# Patient Record
Sex: FEMALE | Race: WHITE | NOT HISPANIC OR LATINO | Employment: OTHER | RURAL
[De-identification: names, ages, dates, MRNs, and addresses within clinical notes are randomized per-mention and may not be internally consistent; named-entity substitution may affect disease eponyms.]

---

## 2023-08-21 ENCOUNTER — OFFICE VISIT (OUTPATIENT)
Dept: ORTHOPEDICS | Facility: CLINIC | Age: 58
End: 2023-08-21
Payer: COMMERCIAL

## 2023-08-21 ENCOUNTER — CLINICAL SUPPORT (OUTPATIENT)
Dept: CARDIOLOGY | Facility: CLINIC | Age: 58
End: 2023-08-21
Payer: COMMERCIAL

## 2023-08-21 VITALS — HEIGHT: 68 IN | WEIGHT: 170 LBS | BODY MASS INDEX: 25.76 KG/M2

## 2023-08-21 DIAGNOSIS — S82.142A CLOSED FRACTURE OF LEFT TIBIAL PLATEAU, INITIAL ENCOUNTER: Primary | ICD-10-CM

## 2023-08-21 DIAGNOSIS — Z01.810 PRE-OPERATIVE CARDIOVASCULAR EXAMINATION: ICD-10-CM

## 2023-08-21 PROCEDURE — 99203 OFFICE O/P NEW LOW 30 MIN: CPT | Mod: PBBFAC | Performed by: ORTHOPAEDIC SURGERY

## 2023-08-21 PROCEDURE — 99204 PR OFFICE/OUTPT VISIT, NEW, LEVL IV, 45-59 MIN: ICD-10-PCS | Mod: S$PBB,,, | Performed by: ORTHOPAEDIC SURGERY

## 2023-08-21 PROCEDURE — 3008F PR BODY MASS INDEX (BMI) DOCUMENTED: ICD-10-PCS | Mod: CPTII,,, | Performed by: ORTHOPAEDIC SURGERY

## 2023-08-21 PROCEDURE — 99204 OFFICE O/P NEW MOD 45 MIN: CPT | Mod: S$PBB,,, | Performed by: ORTHOPAEDIC SURGERY

## 2023-08-21 PROCEDURE — 99212 OFFICE O/P EST SF 10 MIN: CPT | Mod: PBBFAC

## 2023-08-21 PROCEDURE — 93010 ELECTROCARDIOGRAM REPORT: CPT | Mod: S$PBB,,, | Performed by: INTERNAL MEDICINE

## 2023-08-21 PROCEDURE — 3008F BODY MASS INDEX DOCD: CPT | Mod: CPTII,,, | Performed by: ORTHOPAEDIC SURGERY

## 2023-08-21 PROCEDURE — 1159F MED LIST DOCD IN RCRD: CPT | Mod: CPTII,,, | Performed by: ORTHOPAEDIC SURGERY

## 2023-08-21 PROCEDURE — 93010 EKG 12-LEAD: ICD-10-PCS | Mod: S$PBB,,, | Performed by: INTERNAL MEDICINE

## 2023-08-21 PROCEDURE — 93005 ELECTROCARDIOGRAM TRACING: CPT | Mod: PBBFAC | Performed by: INTERNAL MEDICINE

## 2023-08-21 PROCEDURE — 1159F PR MEDICATION LIST DOCUMENTED IN MEDICAL RECORD: ICD-10-PCS | Mod: CPTII,,, | Performed by: ORTHOPAEDIC SURGERY

## 2023-08-21 RX ORDER — HYDROCODONE BITARTRATE AND ACETAMINOPHEN 7.5; 325 MG/1; MG/1
TABLET ORAL
COMMUNITY
Start: 2023-08-21

## 2023-08-21 RX ORDER — TRAMADOL HYDROCHLORIDE 50 MG/1
50 TABLET ORAL EVERY 6 HOURS
Qty: 30 TABLET | Refills: 0 | Status: SHIPPED | OUTPATIENT
Start: 2023-08-21

## 2023-08-21 NOTE — PATIENT INSTRUCTIONS
Your surgery is scheduled at [facility] in [place] for 2023    Pre-Op Testin2023    _______ Lab (Clinic Lab 1st Floor)  _______ Chest X-ray ([facility] 1st Floor)  ___x____ EKG (Clinic 2nd Floor)    Our office will contact you the day before surgery to give you  the arrival time.    *Do NOT eat or drink anything after midnight the night before your surgery.    *Bring all medications in their original bottles.    *Bring anything you may need for an overnight stay.     *Bathe with Hibiclens the night or morning before surgery.    *The morning of your surgery ONLY take blood pressure medications, heart medications, medications for acid reflux, and thyroid medications (the morning dose only).  *Take these medications with a sip of water.    *Do not take Insulin or Diabetic Medications the night before or the morning of your surgery, unless directed otherwise.    *Be sure that you have stopped blood thinners at the appropriate time, as instructed.  (_____ days prior to surgery)    *Bring your C-Pap machine if you have one.    *All jewelry and piercings MUST be removed prior to surgery.    *False eye lashes must be removed prior to surgery.    *Any questions regarding co-pays or deductibles with insurance, please contact the [facility] at #[phone].    *For Financial Assistance you may call #[phone] or #[phone].    Thank you for choosing Dr. Woo Liu for your Orthopedic needs.  We look forward to caring for you. If you have any questions, please contact our office at [phone].

## 2023-08-21 NOTE — PROGRESS NOTES
58-year-old female who Percocet step-off of            Clinic Note        CC:   Chief Complaint   Patient presents with    Left Leg - Injury    Left Knee - Injury        Principal problem: Closed fracture of left tibial plateau, initial encounter [S82.346A]     REASON FOR VISIT:       HISTORY:  58-year-old female who sustained a fall landing on her left leg she has a fracture of her lateral tibial plateau on left.      PAST MEDICAL HISTORY: History reviewed. No pertinent past medical history.       PAST SURGICAL HISTORY:   Past Surgical History:   Procedure Laterality Date     SECTION      HYSTERECTOMY            ALLERGIES: Review of patient's allergies indicates:  No Known Allergies     MEDICATIONS :  No current outpatient medications on file.     SOCIAL HISTORY:   Social History     Socioeconomic History    Marital status:    Tobacco Use    Smoking status: Never    Smokeless tobacco: Never   Substance and Sexual Activity    Alcohol use: Never    Drug use: Never          FAMILY HISTORY:   Family History   Problem Relation Age of Onset    Cancer Mother     Nelda Parkinson White syndrome Mother           PHYSICAL EXAM:  In general, this is a well-developed, well-nourished female . The patient is alert, oriented and cooperative.      HEENT:  Normocephalic, atraumatic.  Extraocular movements are intact bilaterally.  The oropharynx is benign.       NECK:  Nontender with good range of motion.      LUNGS:  Clear to auscultation bilaterally.      HEART:  Demonstrates a regular rate and rhythm.  No murmurs appreciated.      ABDOMEN:  Soft, non-tender, non-distended.        EXTREMITIES:  Left lower extremity she moves her toes has motor function distally 5/5 sensation to touch she has swelling with some tenderness and ecchymosis over proximal tibia.  Pain on attempted motion of the knee.      RADIOGRAPHIC FINDINGS:  X-rays show fracture of the tibial plateau with depression of the lateral joint  line      IMPRESSION: Closed fracture of left tibial plateau, initial encounter         PLAN:  This time we discussed treatment options we are going to plan on open reduction internal fixation left tibial plateau fracture with a possible bone grafting.  We discussed risks and benefits wished to proceed with procedure.  We will set her up for surgery in the near future.  There are no Patient Instructions on file for this visit.      No follow-ups on file.         Woo Liu      (Subject to voice recognition error, transcription service not allowed)

## 2023-08-21 NOTE — PROGRESS NOTES
Clinic Note        CC:   Chief Complaint   Patient presents with    Left Leg - Injury    Left Knee - Injury        Principal problem: Closed fracture of left tibial plateau, initial encounter [S83.709Q]     REASON FOR VISIT:       HISTORY:  58-year-old female who sustained a fall when she landed on her feet she sustained a fracture of her lateral tibial plateau CT and x-rays show she has a central depression      PAST MEDICAL HISTORY: No past medical history on file.       PAST SURGICAL HISTORY: No past surgical history on file.       ALLERGIES: Review of patient's allergies indicates:  Not on File     MEDICATIONS :  No current outpatient medications on file.     SOCIAL HISTORY:   Social History     Socioeconomic History    Marital status:           FAMILY HISTORY: No family history on file.       PHYSICAL EXAM:  In general, this is a well-developed, well-nourished female . The patient is alert, oriented and cooperative.      HEENT:  Normocephalic, atraumatic.  Extraocular movements are intact bilaterally.  The oropharynx is benign.       NECK:  Nontender with good range of motion.      LUNGS:  Clear to auscultation bilaterally.      HEART:  Demonstrates a regular rate and rhythm.  No murmurs appreciated.      ABDOMEN:  Soft, non-tender, non-distended.        EXTREMITIES:  Left lower extremity she moves her toes has sensation to touch has palpable pulses tender to palpation over her knee she has some swelling ecchymosis noted pain on attempted motion of the knee she is in an immobilizer.      RADIOGRAPHIC FINDINGS:  X-rays show fracture with depression lateral tibial plateau on left      IMPRESSION: Closed fracture of left tibial plateau, initial encounter         PLAN:  We discussed treatment options including risks and benefits of surgery.  We are going to plan on doing a surgical open reduction internal fixation possible bone grafting of the left tibial plateau will set this up as her soft  tissues allow later in a week.  There are no Patient Instructions on file for this visit.      No follow-ups on file.         Woo Liu      (Subject to voice recognition error, transcription service not allowed)

## 2023-08-24 ENCOUNTER — ANESTHESIA (OUTPATIENT)
Dept: SURGERY | Facility: HOSPITAL | Age: 58
End: 2023-08-24
Payer: COMMERCIAL

## 2023-08-24 ENCOUNTER — HOSPITAL ENCOUNTER (OUTPATIENT)
Facility: HOSPITAL | Age: 58
Discharge: HOME HEALTH | End: 2023-08-25
Attending: ORTHOPAEDIC SURGERY | Admitting: ORTHOPAEDIC SURGERY
Payer: COMMERCIAL

## 2023-08-24 ENCOUNTER — ANESTHESIA EVENT (OUTPATIENT)
Dept: SURGERY | Facility: HOSPITAL | Age: 58
End: 2023-08-24
Payer: COMMERCIAL

## 2023-08-24 VITALS
HEART RATE: 79 BPM | DIASTOLIC BLOOD PRESSURE: 72 MMHG | RESPIRATION RATE: 11 BRPM | OXYGEN SATURATION: 97 % | SYSTOLIC BLOOD PRESSURE: 145 MMHG

## 2023-08-24 DIAGNOSIS — S82.142A TIBIAL PLATEAU FRACTURE, LEFT: ICD-10-CM

## 2023-08-24 PROCEDURE — 25000003 PHARM REV CODE 250: Performed by: ORTHOPAEDIC SURGERY

## 2023-08-24 PROCEDURE — 27535 PR OPEN TX TIBIAL FRACTURE PROXIMAL UNICONDYLAR: ICD-10-PCS | Mod: LT,,, | Performed by: ORTHOPAEDIC SURGERY

## 2023-08-24 PROCEDURE — 27000221 HC OXYGEN, UP TO 24 HOURS

## 2023-08-24 PROCEDURE — C1889 IMPLANT/INSERT DEVICE, NOC: HCPCS | Performed by: ORTHOPAEDIC SURGERY

## 2023-08-24 PROCEDURE — 63600175 PHARM REV CODE 636 W HCPCS: Performed by: ANESTHESIOLOGY

## 2023-08-24 PROCEDURE — 36000710: Performed by: ORTHOPAEDIC SURGERY

## 2023-08-24 PROCEDURE — 36000711: Performed by: ORTHOPAEDIC SURGERY

## 2023-08-24 PROCEDURE — 27000655: Performed by: ANESTHESIOLOGY

## 2023-08-24 PROCEDURE — D9220A PRA ANESTHESIA: Mod: CRNA,,, | Performed by: NURSE ANESTHETIST, CERTIFIED REGISTERED

## 2023-08-24 PROCEDURE — D9220A PRA ANESTHESIA: ICD-10-PCS | Mod: CRNA,,, | Performed by: NURSE ANESTHETIST, CERTIFIED REGISTERED

## 2023-08-24 PROCEDURE — 27535 TREAT KNEE FRACTURE: CPT | Mod: LT,,, | Performed by: ORTHOPAEDIC SURGERY

## 2023-08-24 PROCEDURE — 27000177 HC AIRWAY, LARYNGEAL MASK: Performed by: NURSE ANESTHETIST, CERTIFIED REGISTERED

## 2023-08-24 PROCEDURE — 27000510 HC BLANKET BAIR HUGGER ANY SIZE: Performed by: NURSE ANESTHETIST, CERTIFIED REGISTERED

## 2023-08-24 PROCEDURE — 25000003 PHARM REV CODE 250: Performed by: ANESTHESIOLOGY

## 2023-08-24 PROCEDURE — 27201423 OPTIME MED/SURG SUP & DEVICES STERILE SUPPLY: Performed by: ORTHOPAEDIC SURGERY

## 2023-08-24 PROCEDURE — 25000003 PHARM REV CODE 250: Performed by: NURSE ANESTHETIST, CERTIFIED REGISTERED

## 2023-08-24 PROCEDURE — 37000008 HC ANESTHESIA 1ST 15 MINUTES: Performed by: ORTHOPAEDIC SURGERY

## 2023-08-24 PROCEDURE — 63600175 PHARM REV CODE 636 W HCPCS: Performed by: ORTHOPAEDIC SURGERY

## 2023-08-24 PROCEDURE — 37000009 HC ANESTHESIA EA ADD 15 MINS: Performed by: ORTHOPAEDIC SURGERY

## 2023-08-24 PROCEDURE — 63600175 PHARM REV CODE 636 W HCPCS: Performed by: NURSE ANESTHETIST, CERTIFIED REGISTERED

## 2023-08-24 PROCEDURE — 27000177 HC AIRWAY, LARYNGEAL MASK: Performed by: ANESTHESIOLOGY

## 2023-08-24 PROCEDURE — D9220A PRA ANESTHESIA: Mod: ANES,,, | Performed by: ANESTHESIOLOGY

## 2023-08-24 PROCEDURE — D9220A PRA ANESTHESIA: ICD-10-PCS | Mod: ANES,,, | Performed by: ANESTHESIOLOGY

## 2023-08-24 PROCEDURE — 99900035 HC TECH TIME PER 15 MIN (STAT)

## 2023-08-24 PROCEDURE — C1713 ANCHOR/SCREW BN/BN,TIS/BN: HCPCS | Performed by: ORTHOPAEDIC SURGERY

## 2023-08-24 PROCEDURE — 27000716 HC OXISENSOR PROBE, ANY SIZE: Performed by: ANESTHESIOLOGY

## 2023-08-24 PROCEDURE — 27000716 HC OXISENSOR PROBE, ANY SIZE: Performed by: NURSE ANESTHETIST, CERTIFIED REGISTERED

## 2023-08-24 PROCEDURE — 71000033 HC RECOVERY, INTIAL HOUR: Performed by: ORTHOPAEDIC SURGERY

## 2023-08-24 DEVICE — SCREW LOCKING 3.5X 80MM: Type: IMPLANTABLE DEVICE | Site: LEG | Status: FUNCTIONAL

## 2023-08-24 DEVICE — SCREW CORTEX 3.5MM X 34MM: Type: IMPLANTABLE DEVICE | Site: LEG | Status: FUNCTIONAL

## 2023-08-24 DEVICE — CHRONOS(TM) GRANULES-LARGE 2.8-5.6MM/10CC-STERILE
Type: IMPLANTABLE DEVICE | Site: LEG | Status: FUNCTIONAL
Brand: CHRONOS

## 2023-08-24 DEVICE — SCREW LOCKING 3.5X 75MM: Type: IMPLANTABLE DEVICE | Site: LEG | Status: FUNCTIONAL

## 2023-08-24 DEVICE — SCREW CORTEX 3.5 38M: Type: IMPLANTABLE DEVICE | Site: LEG | Status: FUNCTIONAL

## 2023-08-24 DEVICE — SCREW LOCKING 3.5X 55MM: Type: IMPLANTABLE DEVICE | Site: LEG | Status: FUNCTIONAL

## 2023-08-24 RX ORDER — KETOROLAC TROMETHAMINE 30 MG/ML
INJECTION, SOLUTION INTRAMUSCULAR; INTRAVENOUS
Status: DISCONTINUED | OUTPATIENT
Start: 2023-08-24 | End: 2023-08-24

## 2023-08-24 RX ORDER — SODIUM CHLORIDE 9 MG/ML
INJECTION, SOLUTION INTRAVENOUS CONTINUOUS
Status: DISCONTINUED | OUTPATIENT
Start: 2023-08-24 | End: 2023-08-25 | Stop reason: HOSPADM

## 2023-08-24 RX ORDER — MORPHINE SULFATE 4 MG/ML
4 INJECTION, SOLUTION INTRAMUSCULAR; INTRAVENOUS EVERY 4 HOURS PRN
Status: DISCONTINUED | OUTPATIENT
Start: 2023-08-24 | End: 2023-08-25 | Stop reason: HOSPADM

## 2023-08-24 RX ORDER — ONDANSETRON 2 MG/ML
4 INJECTION INTRAMUSCULAR; INTRAVENOUS DAILY PRN
Status: DISCONTINUED | OUTPATIENT
Start: 2023-08-24 | End: 2023-08-24 | Stop reason: HOSPADM

## 2023-08-24 RX ORDER — HYDROCODONE BITARTRATE AND ACETAMINOPHEN 5; 325 MG/1; MG/1
1 TABLET ORAL EVERY 4 HOURS PRN
Status: DISCONTINUED | OUTPATIENT
Start: 2023-08-24 | End: 2023-08-25 | Stop reason: HOSPADM

## 2023-08-24 RX ORDER — BISACODYL 10 MG
10 SUPPOSITORY, RECTAL RECTAL DAILY PRN
Status: DISCONTINUED | OUTPATIENT
Start: 2023-08-24 | End: 2023-08-25 | Stop reason: HOSPADM

## 2023-08-24 RX ORDER — DEXAMETHASONE SODIUM PHOSPHATE 4 MG/ML
INJECTION, SOLUTION INTRA-ARTICULAR; INTRALESIONAL; INTRAMUSCULAR; INTRAVENOUS; SOFT TISSUE
Status: DISCONTINUED | OUTPATIENT
Start: 2023-08-24 | End: 2023-08-24

## 2023-08-24 RX ORDER — DOCUSATE SODIUM 100 MG/1
100 CAPSULE, LIQUID FILLED ORAL EVERY 12 HOURS
Status: DISCONTINUED | OUTPATIENT
Start: 2023-08-24 | End: 2023-08-25 | Stop reason: HOSPADM

## 2023-08-24 RX ORDER — MEPERIDINE HYDROCHLORIDE 25 MG/ML
25 INJECTION INTRAMUSCULAR; INTRAVENOUS; SUBCUTANEOUS ONCE AS NEEDED
Status: DISCONTINUED | OUTPATIENT
Start: 2023-08-24 | End: 2023-08-24 | Stop reason: HOSPADM

## 2023-08-24 RX ORDER — HYDROMORPHONE HYDROCHLORIDE 2 MG/ML
0.5 INJECTION, SOLUTION INTRAMUSCULAR; INTRAVENOUS; SUBCUTANEOUS EVERY 5 MIN PRN
Status: DISCONTINUED | OUTPATIENT
Start: 2023-08-24 | End: 2023-08-24 | Stop reason: HOSPADM

## 2023-08-24 RX ORDER — SODIUM CHLORIDE 9 MG/ML
INJECTION, SOLUTION INTRAVENOUS CONTINUOUS
Status: DISCONTINUED | OUTPATIENT
Start: 2023-08-24 | End: 2023-08-24

## 2023-08-24 RX ORDER — ONDANSETRON 2 MG/ML
INJECTION INTRAMUSCULAR; INTRAVENOUS
Status: DISCONTINUED | OUTPATIENT
Start: 2023-08-24 | End: 2023-08-24

## 2023-08-24 RX ORDER — TRAMADOL HYDROCHLORIDE 50 MG/1
50 TABLET ORAL ONCE
Status: COMPLETED | OUTPATIENT
Start: 2023-08-24 | End: 2023-08-24

## 2023-08-24 RX ORDER — DIPHENHYDRAMINE HYDROCHLORIDE 50 MG/ML
25 INJECTION INTRAMUSCULAR; INTRAVENOUS EVERY 6 HOURS PRN
Status: DISCONTINUED | OUTPATIENT
Start: 2023-08-24 | End: 2023-08-24 | Stop reason: HOSPADM

## 2023-08-24 RX ORDER — FENTANYL CITRATE 50 UG/ML
INJECTION, SOLUTION INTRAMUSCULAR; INTRAVENOUS
Status: DISCONTINUED | OUTPATIENT
Start: 2023-08-24 | End: 2023-08-24

## 2023-08-24 RX ORDER — ONDANSETRON 2 MG/ML
4 INJECTION INTRAMUSCULAR; INTRAVENOUS EVERY 8 HOURS PRN
Status: DISCONTINUED | OUTPATIENT
Start: 2023-08-24 | End: 2023-08-25 | Stop reason: HOSPADM

## 2023-08-24 RX ORDER — MORPHINE SULFATE 10 MG/ML
4 INJECTION INTRAMUSCULAR; INTRAVENOUS; SUBCUTANEOUS EVERY 5 MIN PRN
Status: DISCONTINUED | OUTPATIENT
Start: 2023-08-24 | End: 2023-08-24 | Stop reason: HOSPADM

## 2023-08-24 RX ORDER — DIMENHYDRINATE 50 MG
50 TABLET ORAL ONCE
Status: COMPLETED | OUTPATIENT
Start: 2023-08-24 | End: 2023-08-24

## 2023-08-24 RX ORDER — LIDOCAINE HYDROCHLORIDE 20 MG/ML
INJECTION, SOLUTION EPIDURAL; INFILTRATION; INTRACAUDAL; PERINEURAL
Status: DISCONTINUED | OUTPATIENT
Start: 2023-08-24 | End: 2023-08-24

## 2023-08-24 RX ORDER — PROPOFOL 10 MG/ML
VIAL (ML) INTRAVENOUS
Status: DISCONTINUED | OUTPATIENT
Start: 2023-08-24 | End: 2023-08-24

## 2023-08-24 RX ORDER — CEFAZOLIN SODIUM 1 G/3ML
INJECTION, POWDER, FOR SOLUTION INTRAMUSCULAR; INTRAVENOUS
Status: DISCONTINUED | OUTPATIENT
Start: 2023-08-24 | End: 2023-08-24

## 2023-08-24 RX ORDER — MIDAZOLAM HYDROCHLORIDE 1 MG/ML
INJECTION INTRAMUSCULAR; INTRAVENOUS
Status: DISCONTINUED | OUTPATIENT
Start: 2023-08-24 | End: 2023-08-24

## 2023-08-24 RX ADMIN — HYDROCODONE BITARTRATE AND ACETAMINOPHEN 1 TABLET: 5; 325 TABLET ORAL at 10:08

## 2023-08-24 RX ADMIN — LIDOCAINE HYDROCHLORIDE 100 MG: 20 INJECTION, SOLUTION INTRAVENOUS at 05:08

## 2023-08-24 RX ADMIN — SODIUM CHLORIDE: 9 INJECTION, SOLUTION INTRAVENOUS at 12:08

## 2023-08-24 RX ADMIN — SODIUM CHLORIDE: 9 INJECTION, SOLUTION INTRAVENOUS at 10:08

## 2023-08-24 RX ADMIN — KETOROLAC TROMETHAMINE 30 MG: 30 INJECTION, SOLUTION INTRAMUSCULAR at 05:08

## 2023-08-24 RX ADMIN — HYDROMORPHONE HYDROCHLORIDE 0.5 MG: 2 INJECTION INTRAMUSCULAR; INTRAVENOUS; SUBCUTANEOUS at 07:08

## 2023-08-24 RX ADMIN — MIDAZOLAM 2 MG: 1 INJECTION INTRAMUSCULAR; INTRAVENOUS at 05:08

## 2023-08-24 RX ADMIN — CEFAZOLIN 2 G: 2 INJECTION, POWDER, FOR SOLUTION INTRAMUSCULAR; INTRAVENOUS at 10:08

## 2023-08-24 RX ADMIN — DEXAMETHASONE SODIUM PHOSPHATE 10 MG: 4 INJECTION, SOLUTION INTRA-ARTICULAR; INTRALESIONAL; INTRAMUSCULAR; INTRAVENOUS; SOFT TISSUE at 05:08

## 2023-08-24 RX ADMIN — PROPOFOL 150 MG: 10 INJECTION, EMULSION INTRAVENOUS at 05:08

## 2023-08-24 RX ADMIN — DIMENHYDRINATE 50 MG: 50 TABLET ORAL at 01:08

## 2023-08-24 RX ADMIN — TRAMADOL HYDROCHLORIDE 50 MG: 50 TABLET, COATED ORAL at 02:08

## 2023-08-24 RX ADMIN — CEFAZOLIN 2 G: 1 INJECTION, POWDER, FOR SOLUTION INTRAMUSCULAR; INTRAVENOUS; PARENTERAL at 05:08

## 2023-08-24 RX ADMIN — FENTANYL CITRATE 50 MCG: 50 INJECTION INTRAMUSCULAR; INTRAVENOUS at 05:08

## 2023-08-24 RX ADMIN — ONDANSETRON 4 MG: 2 INJECTION INTRAMUSCULAR; INTRAVENOUS at 05:08

## 2023-08-24 RX ADMIN — DOCUSATE SODIUM 100 MG: 100 CAPSULE, LIQUID FILLED ORAL at 10:08

## 2023-08-24 NOTE — ANESTHESIA PREPROCEDURE EVALUATION
08/24/2023  Penny Murphy is a 58 y.o., female.      Pre-op Assessment    I have reviewed the Patient Summary Reports.     I have reviewed the Nursing Notes. I have reviewed the NPO Status.   I have reviewed the Medications.     Review of Systems  Anesthesia Hx:  Denies Family Hx of Anesthesia complications.  Personal Hx of Anesthesia complications, Post-Operative Nausea/Vomiting, in the past, but not with recent anesthetics / prophylaxis Slow To Awaken/Delayed Emergence and mild, somewhat sensitive to sedation / narcotics   Cardiovascular:   Exercise tolerance: good        Physical Exam  General: Well nourished, Cooperative and Alert    Airway:  Mallampati: II   Mouth Opening: Normal  TM Distance: Normal  Tongue: Normal  Neck ROM: Normal ROM    Dental:  Intact    Chest/Lungs:  Clear to auscultation, Normal Respiratory Rate    Heart:  Rate: Normal  Rhythm: Regular Rhythm          Anesthesia Plan  Type of Anesthesia, risks & benefits discussed:    Anesthesia Type: Gen Supraglottic Airway  Intra-op Monitoring Plan: Standard ASA Monitors  Post Op Pain Control Plan: multimodal analgesia  Induction:  IV  Airway Plan: Direct, Post-Induction  Informed Consent: Informed consent signed with the Patient and all parties understand the risks and agree with anesthesia plan.  All questions answered.   ASA Score: 1  Day of Surgery Review of History & Physical: H&P Update referred to the surgeon/provider.I have interviewed and examined the patient. I have reviewed the patient's H&P dated: There are no significant changes.   Anesthesia Plan Notes: ASA 1 planned GA-LMA    Patient has h/o delayed emergence and PONV that occurred with hysterectomy. Planned used of short acting medications when possible.    Ready For Surgery From Anesthesia Perspective.     .

## 2023-08-24 NOTE — PROGRESS NOTES
Department of Orthopedic Surgery    History and Physical       Principal Problem: Closed fracture of left tibial plateau, initial encounter [E02.781U]           HISTORY:  58-year-old female with a left lateral tibial plateau fracture that is displaced needing open reduction internal fixation with possible bone grafting of the left tibial plateau    PAST MEDICAL HISTORY: History reviewed. No pertinent past medical history.     PAST SURGICAL HISTORY:   Past Surgical History:   Procedure Laterality Date     SECTION      HYSTERECTOMY  2006          ALLERGIES: Review of patient's allergies indicates:  No Known Allergies       MEDICATIONS: (Not in a hospital admission)       SOCIAL HISTORY:   Social History     Socioeconomic History    Marital status:    Tobacco Use    Smoking status: Never    Smokeless tobacco: Never   Substance and Sexual Activity    Alcohol use: Never    Drug use: Never          FAMILY HISTORY:   Family History   Problem Relation Age of Onset    Cancer Mother     Nelda Parkinson White syndrome Mother           PHYSICAL EXAM:   There were no vitals filed for this visit.  Body mass index is 25.85 kg/m².     In general, this is a well-developed, well-nourished female . The patient is alert, oriented and cooperative.      HEENT:  Normocephalic, atraumatic.  Extraocular movements are intact bilaterally.  The oropharynx is benign.       NECK:  Nontender with good range of motion.      LUNGS:  Clear to auscultation bilaterally.      HEART:  Demonstrates a regular rate and rhythm.  No murmurs appreciated.      ABDOMEN:  Soft, non-tender, non-distended.        EXTREMITIES:  Left lower extremity she moves her toes she has sensation to touch has palpable pulses she has swelling ecchymosis about her knee tender to palpation over lateral tibial plateau pain on attempted motion of the knee      RADIOGRAPHIC FINDINGS:  X-rays show a depressed tibial plateau fracture on left CT confirms these  findings      IMPRESSION:  Left lateral tibial plateau fracture displaced      PLAN:  Open reduction internal fixation left lateral tibial plateau fracture with possible allograft bone grafting    I had a long discussion with the patient about treatment options, including operative and nonoperative treatments. We discussed pros and cons of each including risks pertinent to surgery including pain, infection, bleeding, damage to adjacent structures like nerves and blood vessels, failure to heal, need for future surgeries, stiffness, instability, loss of limb, anesthesia risks like stroke, blood clot, loss of life. We discussed the possibility of need for later hardware removal in the case that hardware was used. We discussed common and uncommon risks, and discussed patient specific factors that may increase the risks present with surgery. All questions were answered. The patient expressed understanding of the pros and cons of surgery and wanted to proceed with surgical treatment.        (Subject to voice recognition error, transcription service not allowed)

## 2023-08-24 NOTE — INTERVAL H&P NOTE
The patient has been examined and the H&P has been reviewed:    I concur with the findings and no changes have occurred since H&P was written.    Surgery risks, benefits and alternative options discussed and understood by patient/family.          Active Hospital Problems    Diagnosis  POA    *Tibial plateau fracture, left [S80.382A]  Yes      Resolved Hospital Problems   No resolved problems to display.

## 2023-08-24 NOTE — H&P (VIEW-ONLY)
Department of Orthopedic Surgery    History and Physical       Principal Problem: Closed fracture of left tibial plateau, initial encounter [U84.396J]           HISTORY:  58-year-old female with a left lateral tibial plateau fracture that is displaced needing open reduction internal fixation with possible bone grafting of the left tibial plateau    PAST MEDICAL HISTORY: History reviewed. No pertinent past medical history.     PAST SURGICAL HISTORY:   Past Surgical History:   Procedure Laterality Date     SECTION      HYSTERECTOMY  2006          ALLERGIES: Review of patient's allergies indicates:  No Known Allergies       MEDICATIONS: (Not in a hospital admission)       SOCIAL HISTORY:   Social History     Socioeconomic History    Marital status:    Tobacco Use    Smoking status: Never    Smokeless tobacco: Never   Substance and Sexual Activity    Alcohol use: Never    Drug use: Never          FAMILY HISTORY:   Family History   Problem Relation Age of Onset    Cancer Mother     Nelda Parkinson White syndrome Mother           PHYSICAL EXAM:   There were no vitals filed for this visit.  Body mass index is 25.85 kg/m².     In general, this is a well-developed, well-nourished female . The patient is alert, oriented and cooperative.      HEENT:  Normocephalic, atraumatic.  Extraocular movements are intact bilaterally.  The oropharynx is benign.       NECK:  Nontender with good range of motion.      LUNGS:  Clear to auscultation bilaterally.      HEART:  Demonstrates a regular rate and rhythm.  No murmurs appreciated.      ABDOMEN:  Soft, non-tender, non-distended.        EXTREMITIES:  Left lower extremity she moves her toes she has sensation to touch has palpable pulses she has swelling ecchymosis about her knee tender to palpation over lateral tibial plateau pain on attempted motion of the knee      RADIOGRAPHIC FINDINGS:  X-rays show a depressed tibial plateau fracture on left CT confirms these  findings      IMPRESSION:  Left lateral tibial plateau fracture displaced      PLAN:  Open reduction internal fixation left lateral tibial plateau fracture with possible allograft bone grafting    I had a long discussion with the patient about treatment options, including operative and nonoperative treatments. We discussed pros and cons of each including risks pertinent to surgery including pain, infection, bleeding, damage to adjacent structures like nerves and blood vessels, failure to heal, need for future surgeries, stiffness, instability, loss of limb, anesthesia risks like stroke, blood clot, loss of life. We discussed the possibility of need for later hardware removal in the case that hardware was used. We discussed common and uncommon risks, and discussed patient specific factors that may increase the risks present with surgery. All questions were answered. The patient expressed understanding of the pros and cons of surgery and wanted to proceed with surgical treatment.        (Subject to voice recognition error, transcription service not allowed)

## 2023-08-24 NOTE — ANESTHESIA PROCEDURE NOTES
Intubation    Date/Time: 8/24/2023 5:07 PM    Performed by: Abilio Garnica CRNA  Authorized by: Steve Castrejon MD    Intubation:     Induction:  Intravenous    Intubated:  Postinduction    Mask Ventilation:  Easy mask    Attempts:  1    Attempted By:  CRNA    Difficult Airway Encountered?: No      Complications:  None    Airway Device:  Supraglottic airway/LMA    Airway Device Size:  4.0    Style/Cuff Inflation:  Cuffed (inflated to minimal occlusive pressure)    Placement Verified By:  Capnometry    Complicating Factors:  None    Findings Post-Intubation:  BS equal bilateral

## 2023-08-25 VITALS
TEMPERATURE: 98 F | SYSTOLIC BLOOD PRESSURE: 155 MMHG | HEIGHT: 68 IN | WEIGHT: 173.06 LBS | BODY MASS INDEX: 26.23 KG/M2 | OXYGEN SATURATION: 98 % | HEART RATE: 87 BPM | RESPIRATION RATE: 16 BRPM | DIASTOLIC BLOOD PRESSURE: 82 MMHG

## 2023-08-25 PROCEDURE — 97161 PT EVAL LOW COMPLEX 20 MIN: CPT

## 2023-08-25 PROCEDURE — 97165 OT EVAL LOW COMPLEX 30 MIN: CPT

## 2023-08-25 PROCEDURE — 63600175 PHARM REV CODE 636 W HCPCS: Performed by: ORTHOPAEDIC SURGERY

## 2023-08-25 PROCEDURE — 25000003 PHARM REV CODE 250: Performed by: ORTHOPAEDIC SURGERY

## 2023-08-25 RX ORDER — HYDROCODONE BITARTRATE AND ACETAMINOPHEN 10; 325 MG/1; MG/1
1 TABLET ORAL EVERY 4 HOURS PRN
Qty: 30 TABLET | Refills: 0 | Status: SHIPPED | OUTPATIENT
Start: 2023-08-25 | End: 2023-09-08

## 2023-08-25 RX ADMIN — MORPHINE SULFATE 4 MG: 4 INJECTION, SOLUTION INTRAMUSCULAR; INTRAVENOUS at 01:08

## 2023-08-25 RX ADMIN — HYDROCODONE BITARTRATE AND ACETAMINOPHEN 1 TABLET: 5; 325 TABLET ORAL at 04:08

## 2023-08-25 RX ADMIN — DOCUSATE SODIUM 100 MG: 100 CAPSULE, LIQUID FILLED ORAL at 09:08

## 2023-08-25 RX ADMIN — CEFAZOLIN 2 G: 2 INJECTION, POWDER, FOR SOLUTION INTRAMUSCULAR; INTRAVENOUS at 04:08

## 2023-08-25 RX ADMIN — MORPHINE SULFATE 4 MG: 4 INJECTION, SOLUTION INTRAMUSCULAR; INTRAVENOUS at 07:08

## 2023-08-25 RX ADMIN — HYDROCODONE BITARTRATE AND ACETAMINOPHEN 1 TABLET: 5; 325 TABLET ORAL at 10:08

## 2023-08-25 RX ADMIN — APIXABAN 2.5 MG: 2.5 TABLET, FILM COATED ORAL at 09:08

## 2023-08-25 NOTE — PLAN OF CARE
Problem: Occupational Therapy  Goal: Occupational Therapy Goal  Description: STG: (in 1 week)  Pt will perform grooming with setup  Pt will bathe with setup  Pt will perform UE dressing with independence  Pt will perform LE dressing with SBA with AD  Pt will transfer bed/chair/bsc with mod(I) with crutches  Pt will perform standing task x 3 min with SBA  Pt will tolerate 10 minutes of tx without fatigue      LTG:(in 4 weeks)  1.Restore to max I with self care and mobility.     Outcome: Adequate for Care Transition

## 2023-08-25 NOTE — OP NOTE
[facility] - Orthopedic Periop Services  General Surgery  Operative Note    SUMMARY     Date of Procedure: 8/24/2023     Procedure: Procedure(s) (LRB):  ORIF, FRACTURE, TIBIA, PLATEAU (Left)  CURETTAGE, LESION, BONE, LOWER EXTREMITY, WITH REPAIR USING ILIAC CREST ALLOGRAFT (Left)       Surgeon(s) and Role:     * Woo Liu MD - Primary    Assisting Surgeon: None    Pre-Operative Diagnosis: Closed fracture of left tibial plateau, initial encounter [S82.142A]    Post-Operative Diagnosis: Post-Op Diagnosis Codes:     * Closed fracture of left tibial plateau, initial encounter [S82.142A]    Anesthesia: General    Operative Findings (including complications, if any):  After risks and benefits of procedure explained at length patient patient stating she understands risks benefits written informed consent was obtained patient was taken operating room placed in supine position on operative table anesthesia induced per Anesthesia.  At this time her left lower extremity had tourniquet placed over cast padding on left thigh left lower extremity prepped and draped sterile fashion.  Leg was elevated exsanguinated with an Esmarch bandage tourniquet inflated 250 mm Hg.  Was up for total of 64 minutes.  At this time a L-shaped incision was made starting distally over the lateral aspect of the tibia going proximally to just below the joint line and curving posteriorly to proximally the fibular head.  It was made the skin with a 15. Blade careful dissection down to the fascia overlying the tibia was performed using pickups scissors this time using sharp dissection of the joint line was opened through the capsule and the periosteum was elevated off the proximal tibia.  The meniscus were noted to be intact inspection joint line show depression centrally of the joint line.  A small cortical window was made anteriorly with the osteotome and working through the opening in the joint line as well as through the bone window the  fracture site was elevated and tri calcium phosphate graft was placed and impacted in position to give structural support.  At this time a 6 hole lateral tibial plateau plate from [employer] was placed and secured with locking and nonlocking screws maintaining reduction of the fracture site.  Wound was irrigated out copious amounts normal saline.  Capsule was then repaired with 1. Vicryl.  Fascia loosely reapproximated with 1. Vicryl.  Subcuticular 2-0 Vicryl followed by skin staples on skin.  Sterile occlusive dressing placed followed by knee immobilizer.  Patient awakened taken recovery room in good condition.  All counts were correct.  No complications.    Description of Technical Procedures:     Significant Surgical Tasks Conducted by the Assistant(s), if Applicable:     Estimated Blood Loss (EBL): * No values recorded between 8/24/2023  5:46 PM and 8/24/2023  7:07 PM *           Implants:   Implant Name Type Inv. Item Serial No.  Lot No. LRB No. Used Action   CHRONOS(TM) GRANULES-LARGE Other (Comment)    [id number] Left 1 Implanted   3.5MM LCP PROXIMAL TIBIA PLATES, LOW BEND Plate     Left 1 Implanted   SCREW LOCKING 3.5X 75MM - [id number]  SCREW LOCKING 3.5X 75MM  SYNTHES  Left 3 Implanted   SCREW LOCKING 3.5X 80MM - [id number]  SCREW LOCKING 3.5X 80MM  SYNTHES  Left 1 Implanted   SCREW CORTEX 3.5MM X 34MM - [id number]  SCREW CORTEX 3.5MM X 34MM  SYNTHES  Left 2 Implanted   SCREW CORTEX 3.5MM 36MM - [id number]  SCREW CORTEX 3.5MM 36MM  SYNTHES  Left 1 Implanted and Explanted   SCREW CORTEX 3.5 38M - [id number]  SCREW CORTEX 3.5 38M  SYNTHES  Left 1 Implanted   SCREW LOCKING 3.5X 55MM - [id number]  SCREW LOCKING 3.5X 55MM  SYNTHES  Left 1 Implanted       Specimens:   Specimen (24h ago, onward)      None                    Condition: Good    Disposition: PACU - hemodynamically stable.    Attestation: I was present and scrubbed for the entire procedure.

## 2023-08-25 NOTE — NURSING
Discharge instructions reviewed with patient and spouse; and copy given to patient. Patient and spouse voiced understanding regarding:meds, appt., signs and symptoms to report to physician.    Reviewed with spouse and patient that patient has the following for discharge:2 scott hoses, incentive spirometer,nozin, crutches, toilet riser at home; dressing change, discharge meds, icepacks, immobilizer.

## 2023-08-25 NOTE — ANESTHESIA POSTPROCEDURE EVALUATION
Anesthesia Post Evaluation    Patient: Penny Murphy    Procedure(s) Performed: Procedure(s) (LRB):  ORIF, FRACTURE, TIBIA, PLATEAU (Left)  CURETTAGE, LESION, BONE, LOWER EXTREMITY, WITH REPAIR USING ILIAC CREST ALLOGRAFT (Left)    Final Anesthesia Type: general      Patient location during evaluation: PACU  Patient participation: Yes- Able to Participate  Level of consciousness: awake and sedated  Post-procedure vital signs: reviewed and stable  Pain management: adequate  Airway patency: patent    PONV status at discharge: No PONV  Anesthetic complications: no      Cardiovascular status: blood pressure returned to baseline  Respiratory status: unassisted  Hydration status: euvolemic  Follow-up not needed.          Vitals Value Taken Time   /87 08/24/23 1921   Temp 98 08/24/23 1924   Pulse 95 08/24/23 1923   Resp 13 08/24/23 1923   SpO2 94 % 08/24/23 1923   Vitals shown include unvalidated device data.      No case tracking events are documented in the log.      Pain/Rosa Score: Pain Rating Prior to Med Admin: 6 (8/24/2023  2:39 PM)

## 2023-08-25 NOTE — DISCHARGE INSTRUCTIONS
Diet is regular.   Norco 10 for pain.    Toe-touch weight-bearing to left leg with use of crutches.    Can take off dressing and wash wound  with antibacterial soap in 3 days.  Follow-up Dr. Liu in 2 weeks.    Wear white stockings at all times; EXCEPT may remove for 1 hour twice daily and then replace.  Continue Incentive spirometer, breathing exercise 10 times every 2 hours while awake.  Increase oral Fluids.

## 2023-08-25 NOTE — PT/OT/SLP EVAL
Occupational Therapy Evaluation     Name: Penny Murphy  MRN: [id number]  Admitting Diagnosis: Tibial plateau fracture, left 1 Day Post-Op  Recent Surgery: Procedure(s) (LRB):  ORIF, FRACTURE, TIBIA, PLATEAU (Left)  CURETTAGE, LESION, BONE, LOWER EXTREMITY, WITH REPAIR USING ILIAC CREST ALLOGRAFT (Left) 1 Day Post-Op    Recommendations:     Discharge Recommendations: home  Level of Assistance Recommended: Intermittent assistance  Discharge Equipment Recommendations: none  Barriers to discharge: None    Assessment:     Penny Murphy is a 58 y.o. female with a medical diagnosis of Tibial plateau fracture, left. She presents with performance deficits affecting function including orthopedic precautions.     Rehab Prognosis: Good; patient would benefit from acute OT services to address these deficits and reach maximum level of function.    Plan:     Patient to be seen 5 x/week to address the above listed problems via self-care/home management, therapeutic activities, therapeutic exercises  Plan of Care Expires: 08/25/23  Plan of Care Reviewed with: patient    Subjective     Chief Complaint: post op ORIF with allograft  Patient Comments/Goals: Pt plans to return home today  Pain/Comfort:  Pain Rating 1: 1/10  Pain Addressed 1: Pre-medicate for activity  Pain Rating Post-Intervention 1: 1/10    Patients cultural, spiritual, Anabaptism conflicts given the current situation: no    Social History:  Living Environment: Patient lives with their daughter in a single story home with ramped  Prior Level of Function: Prior to admission, patient was modified independent.  Roles and Routines: Patient was driving and working prior to admission.  Equipment Used at Home: crutches, axillary  DME owned (not currently used): none  Assistance Upon Discharge: family    Objective:     Communicated with CESAR Hu prior to session. Patient found HOB elevated with peripheral IV upon OT entry to room.    General Precautions: Standard,  fall   Orthopedic Precautions: LLE toe touch weight bearing (Pt is not to flex her knee and to keep Knee Immobilizer on)   Braces: Knee immobilizer    Respiratory Status: Room air    Occupational Performance    Gait belt applied - Yes    Bed Mobility:   Supine to sit from right side of bed with modified independence    Functional Mobility/Transfers:  Sit <> Stand Transfer with supervision with axillary crutches  Bed <> Chair Transfer using Step Transfer technique with supervision with axillary crutches and NWB to (L) LE per pt's choice   Toilet Transfer Step Transfer technique with supervision with axillary crutches  Functional Mobility: Pt demonstrated good safety with crutches with knee immobilizer and NWB to TTWB of (L) LE    Activities of Daily Living:  Grooming: independence  Upper Body Dressing: independence  Lower Body Dressing: minimum assistance and using reacher and sockaid  Toileting: stand by assistance    Cognitive/Visual Perceptual:  Cognitive/Psychosocial Skills:    -     Oriented to: Person, Place, Time, Situation  -     Follows Commands/attention: Follows two-step commands  -     Communication: clear/fluent  -     Safety awareness/insight to disability: intact  -     Mood/Affect/Coping skills/emotional control: Cooperative  Visual/Perceptual:    -     Intact    Physical Exam:  Balance:    -     Sitting: independence  -     Standing: modified independence  Motor Planning: Intact  Dominant hand: Right  Upper Extremity Range of Motion:     -       Right Upper Extremity: WFL  -       Left Upper Extremity: WFL  Upper Extremity Strength:    -       Right Upper Extremity: WNL  -       Left Upper Extremity: WNL   Strength:    -       Right Upper Extremity: WNL  -       Left Upper Extremity: WNL  Gross motor coordination:   WFL    AMPAC 6 Click ADL:  AMPAC Total Score: 22    Treatment & Education:  Educated on use of hip kit during LB dressing  Educated on performing functional mobility and ADLs in  adherence to orthopedic precautions  Educated on weight bearing status      Patient clear to ambulate to/from bathroom with RN/PCT, assist x1 .    Patient left up in chair with all lines intact, call button in reach, and RN notified.    GOALS:   Multidisciplinary Problems       Occupational Therapy Goals          Problem: Occupational Therapy    Goal Priority Disciplines Outcome Interventions   Occupational Therapy Goal     OT, PT/OT Adequate for Care Transition    Description: STG: (in 1 week)  Pt will perform grooming with setup  Pt will bathe with setup  Pt will perform UE dressing with independence  Pt will perform LE dressing with SBA with AD  Pt will transfer bed/chair/bsc with mod(I) with crutches  Pt will perform standing task x 3 min with SBA  Pt will tolerate 10 minutes of tx without fatigue      LTG:(in 4 weeks)  1.Restore to max I with self care and mobility.                          History:     History reviewed. No pertinent past medical history.      Past Surgical History:   Procedure Laterality Date     SECTION      HYSTERECTOMY         Time Tracking:     OT Date of Treatment: 23  OT Start Time: 845  OT Stop Time: 931  OT Total Time (min): 46 min    Billable Minutes: Evaluation low complexity    2023

## 2023-08-25 NOTE — PLAN OF CARE
Problem: Adult Inpatient Plan of Care  Goal: Plan of Care Review  Outcome: Met  Goal: Patient-Specific Goal (Individualized)  Outcome: Met  Goal: Absence of Hospital-Acquired Illness or Injury  Outcome: Met  Goal: Optimal Comfort and Wellbeing  Outcome: Met  Goal: Readiness for Transition of Care  Outcome: Met     Problem: Fall Injury Risk  Goal: Absence of Fall and Fall-Related Injury  Outcome: Met     Problem: Pain Acute  Goal: Acceptable Pain Control and Functional Ability  Outcome: Met     Problem: Occupational Therapy  Goal: Occupational Therapy Goal  Description: STG: (in 1 week)  Pt will perform grooming with setup  Pt will bathe with setup  Pt will perform UE dressing with independence  Pt will perform LE dressing with SBA with AD  Pt will transfer bed/chair/bsc with mod(I) with crutches  Pt will perform standing task x 3 min with SBA  Pt will tolerate 10 minutes of tx without fatigue      LTG:(in 4 weeks)  1.Restore to max I with self care and mobility.     Outcome: Met

## 2023-08-25 NOTE — CONSULTS
Consult acknowledged. Pt expected to dc on this day. 0 dc needs noted. Per MD note, dc disposition is home with self care.

## 2023-08-25 NOTE — DISCHARGE SUMMARY
[facility]  Orthopedics  Discharge Summary      Patient Name: Penny Murphy  MRN: [id number]  Admission Date: 8/24/2023  Hospital Length of Stay: 0 days  Discharge Date and Time:  08/25/2023 7:40 AM  Attending Physician: Sheryl Bartlett MD   Discharging Provider: Sheryl Bartlett MD  Primary Care Provider: Sheryl Damon, PRISCILLA    HPI:   No notes on file    Procedure(s) (LRB):  ORIF, FRACTURE, TIBIA, PLATEAU (Left)  CURETTAGE, LESION, BONE, LOWER EXTREMITY, WITH REPAIR USING ILIAC CREST ALLOGRAFT (Left)      Hospital Course:  No notes on file was admitted the hospital on 08/24/2023 underwent a left tibial plateau open reduction internal fixation.  Postop day 1 she moves her toes dorsiflexion plantar flexion.  Has sensation to touch.  Has palpable pulses in dorsalis pedis artery.  Pain is controlled p.o. pain meds.  She is going to be started on Lovenox for DVT prophylaxis while she is mobilized for the 1st 2 weeks.  Diet is regular Norco 10 for pain.  Toe-touch weight-bearing only.  Can take off dressing and wash wound in 3 days.  Follow-up Dr. Bartlett in 2 weeks.  No complications during her stay.  Goals of Care Treatment Preferences:  Code Status: Full Code      Consults (From admission, onward)        Status Ordering Provider     IP consult case management/social work  Once        Provider:  (Not yet assigned)    Acknowledged SHERYL BARTLETT          Significant Diagnostic Studies: Labs: All labs within the past 24 hours have been reviewed    Pending Diagnostic Studies:     None        Final Active Diagnoses:    Diagnosis Date Noted POA    PRINCIPAL PROBLEM:  Tibial plateau fracture, left [S82.142A] 08/21/2023 Yes      Problems Resolved During this Admission:      Discharged Condition: good    Disposition: Home or Self Care    Follow Up:    Patient Instructions:   No discharge procedures on file.  Medications:  Reconciled Home Medications:      Medication List      ASK your doctor about  these medications    HYDROcodone-acetaminophen 7.5-325 mg per tablet  Commonly known as: NORCO     traMADoL 50 mg tablet  Commonly known as: ULTRAM  Take 1 tablet (50 mg total) by mouth every 6 (six) hours.            Woo Liu MD  Orthopedics  [facility]

## 2023-08-25 NOTE — OR NURSING
1910 REC'D TO PACU SLIGHTLY DROWSY IN STABLE CONDITION ON RA. SATS 94%. VSS. DENIES PAIN AT THIS TIME. DSG TO L KNEE C/D/I W/ IMMOBILIZER IN PLACE. WILL CONT TO MONITOR.     1925 DILAUDID 0.5MG GIVEN FOR C/O L KNEE PAIN RATED 8/10 ON PAIN SCALE. WILL TITRATE FOR PAIN CONTROL.     1944 UPDATE GIVEN TO FAMILY VIA TEXT MESSAGE.     2000 TRANSFERRED TO ROOM #453 IN STABLE CONDITION. /90 HR 97 T 98.1 SATS 98% ON 2L/NC. SCD DEVICE APPLIED TO RLE.

## 2023-08-25 NOTE — PLAN OF CARE
Problem: Physical Therapy  Goal: Physical Therapy Goal  Description: STG:  Pt will be independent in home exercise program   Pt will be independent in gait using crutches with TDWB: left lower extremity, knee immobilizer     LTG:  Pt will return home to prior level of function with all mobility    Outcome: Met

## 2023-08-25 NOTE — PT/OT/SLP EVAL
Physical Therapy Evaluation and Discharge Note    Patient Name:  Penny Murphy   MRN:  [id number]    Recommendations:     Discharge Recommendations: home  Discharge Equipment Recommendations: none   Barriers to discharge: None    Assessment:     Penny Murphy is a 58 y.o. female admitted with a medical diagnosis of Tibial plateau fracture, left. Pt was using crutches prior to surgery. She demonstrates good understanding of TTWB with crutches.  At this time, patient demonstrates safe mobility and does not require further acute PT services.     Recent Surgery: Procedure(s) (LRB):  ORIF, FRACTURE, TIBIA, PLATEAU (Left)  CURETTAGE, LESION, BONE, LOWER EXTREMITY, WITH REPAIR USING ILIAC CREST ALLOGRAFT (Left) 1 Day Post-Op    Plan:     During this hospitalization, patient does not require further acute PT services.  Please re-consult if situation changes.      Subjective     Chief Complaint: left tibial plateau fx  Patient/Family Comments/goals: Pt is agreeable to PT   Pain/Comfort:  Pain Rating 1: 1/10  Location - Side 1: Left  Location 1: knee  Pain Addressed 1: Pre-medicate for activity  Pain Rating Post-Intervention 1: 1/10    Patients cultural, spiritual, Tenriism conflicts given the current situation: no    Living Environment:  Pt lives at home with daughter  Prior to admission, patients level of function was independent.  Equipment used at home: crutches, axillary.  DME owned (not currently used): wheelchair.  Upon discharge, patient will have assistance from family.    Objective:     Communicated with PANFILO Hu RN prior to session.  Patient found HOB elevated with peripheral IV, knee immobilizer upon PT entry to room.    General Precautions: Standard, fall    Orthopedic Precautions:LLE toe touch weight bearing   Braces: Knee immobilizer  Respiratory Status: Nasal cannula, flow 2 L/min    Exams:  Cognitive Exam:  Patient is oriented to Person, Place, Time, and Situation  Gross Motor Coordination:  WFL  RLE ROM:  WFL  RLE Strength: WFL  LLE ROM: in knee immobilizer  LLE Strength: WFL    Functional Mobility:  Bed Mobility:     Scooting: supervision  Supine to Sit: supervision  Transfers:     Sit to Stand:  contact guard assistance with axillary crutches  Toilet Transfer: contact guard assistance with  axillary crutches  using  Step Transfer  Gait: 80 ft stand-by assistance with crutches, swing to pattern  Balance: good    AM-PAC 6 CLICK MOBILITY  Total Score:23       Treatment and Education:  N/a     AM-PAC 6 CLICK MOBILITY  Total Score:23     Patient left up in chair with all lines intact and call button in reach.    GOALS:   Multidisciplinary Problems       Physical Therapy Goals       Not on file              Multidisciplinary Problems (Resolved)          Problem: Physical Therapy    Goal Priority Disciplines Outcome Goal Variances Interventions   Physical Therapy Goal   (Resolved)     PT, PT/OT Met     Description: STG:  Pt will be independent in home exercise program   Pt will be independent in gait using crutches with TDWB: left lower extremity, knee immobilizer     LTG:  Pt will return home to prior level of function with all mobility                         History:     History reviewed. No pertinent past medical history.    Past Surgical History:   Procedure Laterality Date     SECTION      HYSTERECTOMY         Time Tracking:     PT Received On: 23  PT Start Time: 851     PT Stop Time: 908  PT Total Time (min): 17 min     Billable Minutes: Evaluation low complexity      2023

## 2023-08-28 ENCOUNTER — TELEPHONE (OUTPATIENT)
Dept: ORTHOPEDICS | Facility: HOSPITAL | Age: 58
End: 2023-08-28
Payer: COMMERCIAL

## 2023-08-28 NOTE — TELEPHONE ENCOUNTER
Is your pain tolerable? yes      Has Home health therapy/outpatient therapy come to see you? Dressing changed today and aquacell applied      Are you taking your ecotrin/lovenox/eliquis? eliquis      Have you had a bowel movement since surgery? yes      Are you wearing your MILTON hose? yes      Are you doing ankle pumps?yes      Are you doing your incentive spirometry?yes      Any complaints/concerns?  none

## 2023-08-30 ENCOUNTER — TELEPHONE (OUTPATIENT)
Dept: ORTHOPEDICS | Facility: HOSPITAL | Age: 58
End: 2023-08-30
Payer: COMMERCIAL

## 2023-08-30 DIAGNOSIS — R11.0 NAUSEA: ICD-10-CM

## 2023-08-30 DIAGNOSIS — S82.142D CLOSED FRACTURE OF LEFT TIBIAL PLATEAU WITH ROUTINE HEALING, SUBSEQUENT ENCOUNTER: Primary | ICD-10-CM

## 2023-08-30 RX ORDER — TRAMADOL HYDROCHLORIDE 50 MG/1
50 TABLET ORAL EVERY 6 HOURS PRN
Qty: 30 TABLET | Refills: 0 | Status: SHIPPED | OUTPATIENT
Start: 2023-08-30

## 2023-08-30 RX ORDER — ONDANSETRON 4 MG/1
4 TABLET, ORALLY DISINTEGRATING ORAL EVERY 6 HOURS PRN
Qty: 20 TABLET | Refills: 0 | Status: SHIPPED | OUTPATIENT
Start: 2023-08-30

## 2023-08-30 NOTE — TELEPHONE ENCOUNTER
----- Message from Lisa Tabares sent at 8/30/2023  4:19 PM CDT -----  Regarding: refill  Patient call to get a refill on (tramadol and something for a upset stomach) sent to [facility], call back number is [phone]

## 2023-08-30 NOTE — TELEPHONE ENCOUNTER
----- Message from Lisa Tabares sent at 8/30/2023  4:19 PM CDT -----  Regarding: refill  Patient call to get a refill on (tramadol and something for a upset stomach) sent to [facility], call back number is [phone]

## 2023-08-30 NOTE — TELEPHONE ENCOUNTER
Is your pain tolerable? yes      Has Home health therapy/outpatient therapy come to see you?   none    Are you taking your ecotrin/lovenox/eliquis? apixaban      Have you had a bowel movement since surgery? yes      Are you wearing your MILTON hose? yes      Are you doing ankle pumps?yes      Are you doing your incentive spirometry?yes      Any complaints/concerns?  none

## 2023-09-07 DIAGNOSIS — Z98.890 S/P ORIF (OPEN REDUCTION INTERNAL FIXATION) FRACTURE: Primary | ICD-10-CM

## 2023-09-07 DIAGNOSIS — Z87.81 S/P ORIF (OPEN REDUCTION INTERNAL FIXATION) FRACTURE: Primary | ICD-10-CM

## 2023-09-11 ENCOUNTER — OFFICE VISIT (OUTPATIENT)
Dept: ORTHOPEDICS | Facility: CLINIC | Age: 58
End: 2023-09-11
Payer: COMMERCIAL

## 2023-09-11 ENCOUNTER — HOSPITAL ENCOUNTER (OUTPATIENT)
Dept: RADIOLOGY | Facility: HOSPITAL | Age: 58
Discharge: HOME | End: 2023-09-11
Attending: ORTHOPAEDIC SURGERY
Payer: COMMERCIAL

## 2023-09-11 DIAGNOSIS — Z87.81 S/P ORIF (OPEN REDUCTION INTERNAL FIXATION) FRACTURE: ICD-10-CM

## 2023-09-11 DIAGNOSIS — Z98.890 S/P ORIF (OPEN REDUCTION INTERNAL FIXATION) FRACTURE: ICD-10-CM

## 2023-09-11 DIAGNOSIS — S82.142A CLOSED FRACTURE OF LEFT TIBIAL PLATEAU, INITIAL ENCOUNTER: Primary | ICD-10-CM

## 2023-09-11 PROCEDURE — 99024 POSTOP FOLLOW-UP VISIT: CPT | Mod: ,,, | Performed by: ORTHOPAEDIC SURGERY

## 2023-09-11 PROCEDURE — 1159F MED LIST DOCD IN RCRD: CPT | Mod: CPTII,,, | Performed by: ORTHOPAEDIC SURGERY

## 2023-09-11 PROCEDURE — 1159F PR MEDICATION LIST DOCUMENTED IN MEDICAL RECORD: ICD-10-PCS | Mod: CPTII,,, | Performed by: ORTHOPAEDIC SURGERY

## 2023-09-11 PROCEDURE — 99213 OFFICE O/P EST LOW 20 MIN: CPT | Mod: PBBFAC | Performed by: ORTHOPAEDIC SURGERY

## 2023-09-11 PROCEDURE — 73560 X-RAY EXAM OF KNEE 1 OR 2: CPT | Mod: TC,LT

## 2023-09-11 PROCEDURE — 99024 PR POST-OP FOLLOW-UP VISIT: ICD-10-PCS | Mod: ,,, | Performed by: ORTHOPAEDIC SURGERY

## 2023-09-11 PROCEDURE — 73560 XR KNEE 1 OR 2 VIEW LEFT: ICD-10-PCS | Mod: 26,LT,, | Performed by: ORTHOPAEDIC SURGERY

## 2023-09-11 PROCEDURE — 73560 X-RAY EXAM OF KNEE 1 OR 2: CPT | Mod: 26,LT,, | Performed by: ORTHOPAEDIC SURGERY

## 2023-09-11 NOTE — PROGRESS NOTES
Radiology Interpretation        Patient Name: Penny Murphy  Date: 9/11/2023  YOB: 1965  MRN# [id number]        ORDERING DIAGNOSIS:  No diagnosis found.        Two views AP lateral left knee skeletally mature individual there is normal mineralization there are plate and screws in place across fracture proximal tibial plateau skin staples in place no shift from previous alignment impression fracture left tibial plateau plate and screws in place no shift alignment            Woo Liu MD

## 2023-09-11 NOTE — PROGRESS NOTES
Patient is here for follow-up of ORIF of her left tibial plateau.  She is neurovascular intact distally.  Wounds show no signs infection.  Staples removed today.  Let her toe-touch weight-bearing left lower extremity.  Continue with the immobilizer other than when bathing.  I will follow back up in 4 weeks.

## 2023-10-02 ENCOUNTER — OFFICE VISIT (OUTPATIENT)
Dept: ORTHOPEDICS | Facility: CLINIC | Age: 58
End: 2023-10-02
Payer: COMMERCIAL

## 2023-10-02 ENCOUNTER — HOSPITAL ENCOUNTER (OUTPATIENT)
Dept: RADIOLOGY | Facility: HOSPITAL | Age: 58
Discharge: HOME | End: 2023-10-02
Attending: ORTHOPAEDIC SURGERY
Payer: COMMERCIAL

## 2023-10-02 VITALS — WEIGHT: 173 LBS | BODY MASS INDEX: 26.22 KG/M2 | HEIGHT: 68 IN

## 2023-10-02 DIAGNOSIS — S82.142D CLOSED FRACTURE OF LEFT TIBIAL PLATEAU WITH ROUTINE HEALING, SUBSEQUENT ENCOUNTER: Primary | ICD-10-CM

## 2023-10-02 DIAGNOSIS — S82.142D CLOSED FRACTURE OF LEFT TIBIAL PLATEAU WITH ROUTINE HEALING, SUBSEQUENT ENCOUNTER: ICD-10-CM

## 2023-10-02 PROCEDURE — 99213 OFFICE O/P EST LOW 20 MIN: CPT | Mod: PBBFAC | Performed by: ORTHOPAEDIC SURGERY

## 2023-10-02 PROCEDURE — 1159F PR MEDICATION LIST DOCUMENTED IN MEDICAL RECORD: ICD-10-PCS | Mod: CPTII,,, | Performed by: ORTHOPAEDIC SURGERY

## 2023-10-02 PROCEDURE — 73590 X-RAY EXAM OF LOWER LEG: CPT | Mod: 26,LT,, | Performed by: ORTHOPAEDIC SURGERY

## 2023-10-02 PROCEDURE — 3008F BODY MASS INDEX DOCD: CPT | Mod: CPTII,,, | Performed by: ORTHOPAEDIC SURGERY

## 2023-10-02 PROCEDURE — 3008F PR BODY MASS INDEX (BMI) DOCUMENTED: ICD-10-PCS | Mod: CPTII,,, | Performed by: ORTHOPAEDIC SURGERY

## 2023-10-02 PROCEDURE — 73590 X-RAY EXAM OF LOWER LEG: CPT | Mod: TC,LT

## 2023-10-02 PROCEDURE — 99024 POSTOP FOLLOW-UP VISIT: CPT | Mod: S$PBB,,, | Performed by: ORTHOPAEDIC SURGERY

## 2023-10-02 PROCEDURE — 99024 PR POST-OP FOLLOW-UP VISIT: ICD-10-PCS | Mod: S$PBB,,, | Performed by: ORTHOPAEDIC SURGERY

## 2023-10-02 PROCEDURE — 1159F MED LIST DOCD IN RCRD: CPT | Mod: CPTII,,, | Performed by: ORTHOPAEDIC SURGERY

## 2023-10-02 PROCEDURE — 73590 XR TIBIA FIBULA 2 VIEW LEFT: ICD-10-PCS | Mod: 26,LT,, | Performed by: ORTHOPAEDIC SURGERY

## 2023-10-02 NOTE — PROGRESS NOTES
Patient is here for follow-up of her ORIF of left tibial plateau.  Took her out of the immobilizer.  Let her work on range of motion.  I will let her start weightbear on left lower extremity.  I will send her to therapy.  I will follow up 4 weeks.  Neurovascularly intact distally.

## 2023-10-02 NOTE — PROGRESS NOTES
Radiology Interpretation        Patient Name: Penny Murphy  Date: 10/2/2023  YOB: 1965  MRN# [id number]        ORDERING DIAGNOSIS:    Encounter Diagnosis   Name Primary?    Closed fracture of left tibial plateau with routine healing, subsequent encounter Yes   Two views AP lateral left tib-fib skeletally mature individual there is a healing fracture lateral tibial plateau with plate and screws in place no shift alignment no loosening of the hardware impression healing fracture left tibial plateau early callus forming plate and screws in place                   Woo Liu MD

## 2023-10-04 ENCOUNTER — CLINICAL SUPPORT (OUTPATIENT)
Dept: REHABILITATION | Facility: HOSPITAL | Age: 58
End: 2023-10-04
Payer: COMMERCIAL

## 2023-10-04 DIAGNOSIS — Z98.890 S/P ORIF (OPEN REDUCTION INTERNAL FIXATION) FRACTURE: ICD-10-CM

## 2023-10-04 DIAGNOSIS — M62.81 QUADRICEPS WEAKNESS: ICD-10-CM

## 2023-10-04 DIAGNOSIS — S82.142D CLOSED FRACTURE OF LEFT TIBIAL PLATEAU WITH ROUTINE HEALING, SUBSEQUENT ENCOUNTER: Primary | ICD-10-CM

## 2023-10-04 DIAGNOSIS — M25.662 DECREASED ROM OF LEFT KNEE: ICD-10-CM

## 2023-10-04 DIAGNOSIS — R26.9 GAIT DISTURBANCE: ICD-10-CM

## 2023-10-04 DIAGNOSIS — Z87.81 S/P ORIF (OPEN REDUCTION INTERNAL FIXATION) FRACTURE: ICD-10-CM

## 2023-10-04 PROCEDURE — 97161 PT EVAL LOW COMPLEX 20 MIN: CPT

## 2023-10-04 PROCEDURE — 97110 THERAPEUTIC EXERCISES: CPT

## 2023-10-04 NOTE — PLAN OF CARE
[facility]   Physical Therapy Initial Evaluation      Name: Penny Murphy  Clinic Number: [id number]    Therapy Diagnosis:   Encounter Diagnosis   Name Primary?    Closed fracture of left tibial plateau with routine healing, subsequent encounter         Physician: Woo Liu MD    Physician Orders: PT Eval and Treat   Medical Diagnosis from Referral: see above  Evaluation Date: 10/4/2023  Authorization Period Expiration: 1/3/2024  Plan of Care Expiration: 2023    Date of Surgery: 2023  Visit # / Visits authorized:    FOTO: 1/ 3    Precautions: Standard     Time In: 10:52 am  Time Out: 11:35 am  Total Billable Time: 43 minutes    Subjective     Date of onset: 2023    History of current condition - Penny reports: was standing on a couch adjusting curtains and fell - tibial plateau fx - open reduction with internal fixation performed on  - just released to weight bear yesterday - has not been doing any ex's at home    Falls: none since initial injury    Imaging: xray    Prior Therapy: none  Social History:  lives with their family  Occupation: cleans houses  Prior Level of Function: independent   Current Level of Function: modified independent     Pain:  Current 0/10, worst 4/10, best 0/10   Location: left knee    Description: Aching, Throbbing, and sore  Aggravating Factors: Standing and Walking  Easing Factors: ice and rest    Patients goals: be able to walk normally without crutches     Medical History:   No past medical history on file.    Surgical History:   Penny Murphy  has a past surgical history that includes  section (); Hysterectomy (); Open reduction and internal fixation (ORIF) of fracture of tibial plateau (Left, 2023); and Curettage of bone lesion of lower extremity with repair using iliac crest bone allograft (Left, 2023).    Medications:   Penny has a current medication list which includes the following  prescription(s): hydrocodone-acetaminophen, ondansetron, tramadol, and tramadol.    Allergies:   Review of patient's allergies indicates:  No Known Allergies     Objective      Left Knee range of motion:  Flexion = 110 degrees   Extension = -8 degrees     Left knee strength:  Flexion = 4/5  Extension = 3+/5    Quad set = poor volitional quad set - co-contracts glutes and hamstrings    Straight leg raise = lag increases by 2 degrees for a 10 degree lag altogether    Intake Outcome Measure for FOTO Knee Survey    Therapist reviewed FOTO scores for Penny Murphy on 10/4/2023.   FOTO report - see Media section or FOTO account episode details.    Intake Score: 40         Treatment     Total Treatment time (time-based codes) separate from Evaluation: 12 minutes     Penny received the treatments listed below:    Propped extension, hamstring stretch, quad set w/ towel roll, short arc quad, straight leg raise and heel slides    Patient Education and Home Exercises     Education provided:   - evaluation results, plan of care and home exercise program     Written Home Exercises Provided: yes. Exercises were reviewed and Penny was able to demonstrate them prior to the end of the session.  Penny demonstrated good  understanding of the education provided. See EMR under Patient Instructions for exercises provided during therapy sessions.    Assessment     Penny is a 58 y.o. female referred to outpatient Physical Therapy with a medical diagnosis of s/p open reduction with internal fixation tibial plateau fx. She presents with decreased left knee range of motion, decreased left knee strength, gait disturbance and overall decrease in functional mobility. Penny was just released to weight bearing as tolerated yesterday. She states she has not been doing any exercises at home. She reports only minimal pain in the knee. She cleans houses for a living so she needs to be able to get down on her knees again.    Patient prognosis is Good.    Patient will benefit from skilled outpatient Physical Therapy to address the deficits stated above and in the chart below, provide patient /family education, and to maximize patientt's level of independence.     Plan of care discussed with patient: Yes  Patient's spiritual, cultural and educational needs considered and patient is agreeable to the plan of care and goals as stated below:     Anticipated Barriers for therapy: none    Medical Necessity is demonstrated by the following  History  Co-morbidities and personal factors that may impact the plan of care [x] LOW: no personal factors / co-morbidities  [] MODERATE: 1-2 personal factors / co-morbidities  [] HIGH: 3+ personal factors / co-morbidities    Moderate / High Support Documentation:   Co-morbidities affecting plan of care: none    Personal Factors:   no deficits     Examination  Body Structures and Functions, activity limitations and participation restrictions that may impact the plan of care [x] LOW: addressing 1-2 elements  [] MODERATE: 3+ elements  [] HIGH: 4+ elements (please support below)    Moderate / High Support Documentation: n/a     Clinical Presentation [x] LOW: stable  [] MODERATE: Evolving  [] HIGH: Unstable     Decision Making/ Complexity Score: low       SHORT TERM GOALS  1.  Patient to be independent with home exercise program to facilitate carryover between therapy visits.  2.  Patient will have 0-130 degrees range of motion left knee.  2.  Patient will perform straight leg raise without quad lag for increased quad control.  3.  Patient will increase manual muscle test of left lower extremity to 4+ to 5/5 for increased stability with gait and activiites of daily living.    LONG TERM GOALS  1.  Patient will go up/down stairs reciprocal pattern without handrails and good eccentric control on left lower extremity.  2.  Patient will ambulate independent without crutches, without deviation and without pain.  3.  Patient will return to being  able to fully squat, stoop and get on knees as needed to clean houses.     Plan     Plan of care Certification: 10/4/2023 to 11/17/2023.    Outpatient Physical Therapy 2 times weekly for 6 weeks to include the following interventions: Electrical Stimulation NMES, Gait Training, Manual Therapy, Moist Heat/ Ice, Neuromuscular Re-ed, Patient Education, Therapeutic Activities, and Therapeutic Exercise.     DAIN HUYNH, PT        Physician's Signature: _________________________________________ Date: ________________

## 2023-10-08 PROBLEM — R26.9 GAIT DISTURBANCE: Status: ACTIVE | Noted: 2023-10-08

## 2023-10-08 PROBLEM — M62.81 QUADRICEPS WEAKNESS: Status: ACTIVE | Noted: 2023-10-08

## 2023-10-08 PROBLEM — Z87.81 S/P ORIF (OPEN REDUCTION INTERNAL FIXATION) FRACTURE: Status: ACTIVE | Noted: 2023-10-08

## 2023-10-08 PROBLEM — M25.662 DECREASED ROM OF LEFT KNEE: Status: ACTIVE | Noted: 2023-10-08

## 2023-10-08 PROBLEM — Z98.890 S/P ORIF (OPEN REDUCTION INTERNAL FIXATION) FRACTURE: Status: ACTIVE | Noted: 2023-10-08

## 2023-10-09 ENCOUNTER — CLINICAL SUPPORT (OUTPATIENT)
Dept: REHABILITATION | Facility: HOSPITAL | Age: 58
End: 2023-10-09
Payer: COMMERCIAL

## 2023-10-09 DIAGNOSIS — S82.142D CLOSED FRACTURE OF LEFT TIBIAL PLATEAU WITH ROUTINE HEALING, SUBSEQUENT ENCOUNTER: ICD-10-CM

## 2023-10-09 DIAGNOSIS — M25.662 DECREASED ROM OF LEFT KNEE: Primary | ICD-10-CM

## 2023-10-09 DIAGNOSIS — R26.9 GAIT DISTURBANCE: ICD-10-CM

## 2023-10-09 DIAGNOSIS — Z87.81 S/P ORIF (OPEN REDUCTION INTERNAL FIXATION) FRACTURE: ICD-10-CM

## 2023-10-09 DIAGNOSIS — Z98.890 S/P ORIF (OPEN REDUCTION INTERNAL FIXATION) FRACTURE: ICD-10-CM

## 2023-10-09 PROCEDURE — 97140 MANUAL THERAPY 1/> REGIONS: CPT

## 2023-10-09 PROCEDURE — 97110 THERAPEUTIC EXERCISES: CPT

## 2023-10-09 PROCEDURE — 97112 NEUROMUSCULAR REEDUCATION: CPT

## 2023-10-09 NOTE — PROGRESS NOTES
[facility]   Physical Therapy Treatment Note      Name: Penny Murphy  Clinic Number: [id number]    Therapy Diagnosis:   Encounter Diagnoses   Name Primary?    Decreased ROM of left knee Yes    Gait disturbance     Closed fracture of left tibial plateau with routine healing, subsequent encounter     S/P ORIF (open reduction internal fixation) fracture      Physician: Woo Liu MD    Visit Date: 10/9/2023    Physician Orders: PT Eval and Treat   Medical Diagnosis from Referral: see above  Evaluation Date: 10/4/2023  Authorization Period Expiration: 1/3/2024  Plan of Care Expiration: 11/17/2023     Date of Surgery: 8/24/2023  Visit # / Visits authorized: 2/13   FOTO: 1/ 3     Precautions: Standard      Time In: 1:45 pm  Time Out: 2:40 pm  Total Billable Time: 55 minutes    PTA Visit #:       Subjective     Patient reports: knee just stiff but not hurting.  She was compliant with home exercise program.  Response to previous treatment: no complaints from evaluation   Functional change: walking some without crutches at home    Pain: 0/10  Location: left knee      Objective      Objective Measures updated at progress report unless specified.     Treatment     Penny received the treatments listed below:      therapeutic exercises to develop strength, endurance, ROM, and flexibility for 18 minutes including:  Bike x 5 minutes   Slant board stretch x 2 minutes  Seated hamstring curls 3 plates x 20 - bilateral   Seated hamstring curls 1 plate x 15 - left  Hamstring stretch in Cybex 3 x 30 second hold w/ manual assist      manual therapy techniques: Joint mobilizations, Myofacial release, Soft tissue Mobilization, and manual stretching were applied for 12 minutes, including:  Overpressure into extension  Passive heel lifts w/ 5 second holds  Hamstring stretching in supine  Patellar mobs    neuromuscular re-education activities to improve: Balance, Coordination, Proprioception, and quad  control for 22 minutes. The following activities were included:  Quad set 3 second hold x 20 (w/ towel roll)  Short arc quad 5 second hold x 20  Straight leg raise 3 x 10  Prone quad set 5 second hold x 20  Closed chain terminal knee extension 3 plates 5 second hold x20    therapeutic activities to improve functional performance for 0  minutes, including:      gait training to improve functional mobility and safety for 3  minutes, including:  Focus on heel strike and full extension on left without crutches    direct contact modalities after being cleared for contraindications:     supervised modalities after being cleared for contradictions:     hot pack for 0 minutes to     cold pack for 0 minutes to     Patient Education and Home Exercises       Education provided:   - cues to not co-contract glutes and hamstrings with quad set     Written Home Exercises Provided: Patient instructed to cont prior HEP. Exercises were reviewed and Penny was able to demonstrate them prior to the end of the session.  Penny demonstrated good  understanding of the education provided. See Electronic Medical Record under Patient Instructions for exercises provided during therapy sessions    Assessment     Penny arrived for her first visit following evaluation. She denied pain, just stiffness. She was able to ride the stationary bike making complete revolutions without complaints. She still lacks full extension so session was focused on improving this as well as quad control. She arrived with bilateral axillary crutches but states she is not using them much at home because they are making her back hurt. She ambulated in clinic without them and cues were given for heel strike and full extension during stance phase. Will continue to progress patient towards goals as tolerated.    Penny is progressing well towards her goals.   Patient prognosis is Good.     Patient will continue to benefit from skilled outpatient physical therapy to address  the deficits listed in the problem list box on initial evaluation, provide pt/family education and to maximize pt's level of independence in the home and community environment.     Patient's spiritual, cultural and educational needs considered and pt agreeable to plan of care and goals.     Anticipated barriers to physical therapy: none    SHORT TERM GOALS  1.  Patient to be independent with home exercise program to facilitate carryover between therapy visits.  2.  Patient will have 0-130 degrees range of motion left knee.  2.  Patient will perform straight leg raise without quad lag for increased quad control.  3.  Patient will increase manual muscle test of left lower extremity to 4+ to 5/5 for increased stability with gait and activiites of daily living.     LONG TERM GOALS  1.  Patient will go up/down stairs reciprocal pattern without handrails and good eccentric control on left lower extremity.  2.  Patient will ambulate independent without crutches, without deviation and without pain.  3.  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     Plan     Plan of care Certification: 10/4/2023 to 11/17/2023.     Outpatient Physical Therapy 2 times weekly for 6 weeks to include the following interventions: Electrical Stimulation NMES, Gait Training, Manual Therapy, Moist Heat/ Ice, Neuromuscular Re-ed, Patient Education, Therapeutic Activities, and Therapeutic Exercise.     DAIN HUYNH, PT

## 2023-10-12 ENCOUNTER — CLINICAL SUPPORT (OUTPATIENT)
Dept: REHABILITATION | Facility: HOSPITAL | Age: 58
End: 2023-10-12
Payer: COMMERCIAL

## 2023-10-12 DIAGNOSIS — M25.662 DECREASED ROM OF LEFT KNEE: Primary | ICD-10-CM

## 2023-10-12 DIAGNOSIS — R26.9 GAIT DISTURBANCE: ICD-10-CM

## 2023-10-12 DIAGNOSIS — Z87.81 S/P ORIF (OPEN REDUCTION INTERNAL FIXATION) FRACTURE: ICD-10-CM

## 2023-10-12 DIAGNOSIS — Z98.890 S/P ORIF (OPEN REDUCTION INTERNAL FIXATION) FRACTURE: ICD-10-CM

## 2023-10-12 PROCEDURE — 97140 MANUAL THERAPY 1/> REGIONS: CPT | Mod: CQ

## 2023-10-12 PROCEDURE — 97110 THERAPEUTIC EXERCISES: CPT | Mod: CQ

## 2023-10-12 PROCEDURE — 97112 NEUROMUSCULAR REEDUCATION: CPT | Mod: CQ

## 2023-10-12 NOTE — PROGRESS NOTES
[facility]   Physical Therapy Treatment Note      Name: Penny Murphy  Clinic Number: [id number]    Therapy Diagnosis:   Encounter Diagnoses   Name Primary?    Decreased ROM of left knee Yes    Gait disturbance     S/P ORIF (open reduction internal fixation) fracture      Physician: Woo Liu MD    Visit Date: 10/12/2023    Physician Orders: PT Eval and Treat   Medical Diagnosis from Referral: see above  Evaluation Date: 10/4/2023  Authorization Period Expiration: 1/3/2024  Plan of Care Expiration: 11/17/2023     Date of Surgery: 8/24/2023  Visit # / Visits authorized: 3/13   FOTO: 1/ 3     Precautions: Standard      Time In: 2:32 pm  Time Out: 3:21 pm  Total Billable Time: 49 minutes    PTA Visit #: 1      Subjective     Patient reports: she was sore after last treatment.  She was compliant with home exercise program.  Response to previous treatment: no complaints from evaluation   Functional change: walking some without crutches at home    Pain: 0/10  Location: left knee      Objective      Objective Measures updated at progress report unless specified.   Case conference with Asmita Martin PT, for initial PTA visit.   Active range of motion: -5 degrees extension, 115 degrees flexion     Treatment     Penny received the treatments listed below:      therapeutic exercises to develop strength, endurance, ROM, and flexibility for 18 minutes including:  Bike x 5 minutes   Slant board stretch x 2 minutes  Seated hamstring curls 3 plates x 20 - bilateral   Seated hamstring curls 1 plate x 15 - left  Hamstring stretch in Cybex 3 x 30 second hold w/ manual assist      manual therapy techniques: Joint mobilizations, Myofacial release, Soft tissue Mobilization, and manual stretching were applied for 8 minutes, including:  Overpressure into extension  Passive heel lifts w/ 5 second holds  Hamstring stretching in supine  Patellar mobs    neuromuscular re-education activities to improve:  Balance, Coordination, Proprioception, and quad control for 22 minutes. The following activities were included:  Quad set 3 second hold x 20 (w/ towel roll)  Short arc quad 5 second hold x 20  Straight leg raise 3 x 10  Prone quad set 5 second hold x 20  Closed chain terminal knee extension 3 plates 5 second hold x 20    therapeutic activities to improve functional performance for 0  minutes, including:      gait training to improve functional mobility and safety for 1  minutes, including:  Correcting gait with unilateral crutch so that patient has it on right side.    direct contact modalities after being cleared for contraindications:     supervised modalities after being cleared for contradictions:       Patient Education and Home Exercises       Education provided:   - cues to not co-contract glutes and hamstrings with quad set     Written Home Exercises Provided: Patient instructed to cont prior HEP. Exercises were reviewed and Penny was able to demonstrate them prior to the end of the session.  Penny demonstrated good  understanding of the education provided. See Electronic Medical Record under Patient Instructions for exercises provided during therapy sessions    Assessment     Penny arrived for her second visit following evaluation. She ambulated in with unilateral crutch in left hand. Demonstrated and explained the correct way is to use crutch in right hand. She had difficulty initiating quad sets but was able to do it by end of treatment. Her active range of motion has improved from -8 degrees extension to -5 degrees. Flexion went from 110 at evaluation to 115 today. Will continue to progress patient towards goals as tolerated.    Penny is progressing well towards her goals.   Patient prognosis is Good.     Patient will continue to benefit from skilled outpatient physical therapy to address the deficits listed in the problem list box on initial evaluation, provide pt/family education and to maximize pt's  level of independence in the home and community environment.     Patient's spiritual, cultural and educational needs considered and pt agreeable to plan of care and goals.     Anticipated barriers to physical therapy: none    SHORT TERM GOALS  1.  Patient to be independent with home exercise program to facilitate carryover between therapy visits.  2.  Patient will have 0-130 degrees range of motion left knee.  2.  Patient will perform straight leg raise without quad lag for increased quad control.  3.  Patient will increase manual muscle test of left lower extremity to 4+ to 5/5 for increased stability with gait and activiites of daily living.     LONG TERM GOALS  1.  Patient will go up/down stairs reciprocal pattern without handrails and good eccentric control on left lower extremity.  2.  Patient will ambulate independent without crutches, without deviation and without pain.  3.  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     Plan     Plan of care Certification: 10/4/2023 to 11/17/2023.     Outpatient Physical Therapy 2 times weekly for 6 weeks to include the following interventions: Electrical Stimulation NMES, Gait Training, Manual Therapy, Moist Heat/ Ice, Neuromuscular Re-ed, Patient Education, Therapeutic Activities, and Therapeutic Exercise.     Tamiko Tate, PTA    10/12/2023

## 2023-10-16 ENCOUNTER — CLINICAL SUPPORT (OUTPATIENT)
Dept: REHABILITATION | Facility: HOSPITAL | Age: 58
End: 2023-10-16
Payer: COMMERCIAL

## 2023-10-16 DIAGNOSIS — Z87.81 S/P ORIF (OPEN REDUCTION INTERNAL FIXATION) FRACTURE: ICD-10-CM

## 2023-10-16 DIAGNOSIS — R26.9 GAIT DISTURBANCE: ICD-10-CM

## 2023-10-16 DIAGNOSIS — Z98.890 S/P ORIF (OPEN REDUCTION INTERNAL FIXATION) FRACTURE: ICD-10-CM

## 2023-10-16 DIAGNOSIS — M25.662 DECREASED ROM OF LEFT KNEE: Primary | ICD-10-CM

## 2023-10-16 PROCEDURE — 97112 NEUROMUSCULAR REEDUCATION: CPT | Mod: CQ

## 2023-10-16 PROCEDURE — 97530 THERAPEUTIC ACTIVITIES: CPT | Mod: CQ

## 2023-10-16 PROCEDURE — 97110 THERAPEUTIC EXERCISES: CPT | Mod: CQ

## 2023-10-16 NOTE — PROGRESS NOTES
[facility]   Physical Therapy Treatment Note      Name: Penny Murphy  Clinic Number: [id number]    Therapy Diagnosis:   Encounter Diagnoses   Name Primary?    Decreased ROM of left knee Yes    Gait disturbance     S/P ORIF (open reduction internal fixation) fracture      Physician: Woo Liu MD    Visit Date: 10/16/2023    Physician Orders: PT Eval and Treat   Medical Diagnosis from Referral: see above  Evaluation Date: 10/4/2023  Authorization Period Expiration: 1/3/2024  Plan of Care Expiration: 11/17/2023     Date of Surgery: 8/24/2023  Visit # / Visits authorized: 4/13   FOTO: 1/ 3     Precautions: Standard      Time In: 1:45 pm  Time Out: 2:33 pm  Total Billable Time: 48 minutes    PTA Visit #: 2      Subjective     Patient reports: she is focusing on bending her knee with gait but it is hard.  She was compliant with home exercise program.  Response to previous treatment: no complaints from evaluation   Functional change: walking some without crutches at home    Pain: 0/10  Location: left knee      Objective      Objective Measures updated at progress report unless specified.   Active range of motion: -2 degrees extension, 119 degrees flexion     Treatment     Penny received the treatments listed below:      therapeutic exercises to develop strength, endurance, ROM, and flexibility for 18 minutes including:  Bike x 5 minutes   Slant board stretch x 2 minutes  Seated hamstring curls 4 plates x 30 - bilateral   Seated hamstring curls 1 plate x 20 - left  Hamstring stretch in Cybex 3 x 30 second hold w/ manual assist    manual therapy techniques: Joint mobilizations, Myofacial release, Soft tissue Mobilization, and manual stretching were applied for 0 minutes, including:  Overpressure into extension  Passive heel lifts w/ 5 second holds  Hamstring stretching in supine  Patellar mobs    neuromuscular re-education activities to improve: Balance, Coordination, Proprioception, and  quad control for 22 minutes. The following activities were included:  Quad set 3 second hold x 20 (w/ towel roll)  Short arc quad 5 second hold x 20  Straight leg raise 3 x 10  Prone quad set 5 second hold   Closed chain terminal knee extension 3 plates 5 second hold x 20    therapeutic activities to improve functional performance for 8 minutes, including:  Bilateral leg press 5 plates x 30  Left leg press 3 plates x 20    gait training to improve functional mobility and safety for 0 minutes, including:  Correcting gait with unilateral crutch so that patient has it on right side.    direct contact modalities after being cleared for contraindications:     supervised modalities after being cleared for contradictions:       Patient Education and Home Exercises       Education provided:   - cues to not co-contract glutes and hamstrings with quad set     Written Home Exercises Provided: Patient instructed to cont prior HEP. Exercises were reviewed and Penny was able to demonstrate them prior to the end of the session.  Penny demonstrated good  understanding of the education provided. See Electronic Medical Record under Patient Instructions for exercises provided during therapy sessions    Assessment     Penny arrived for her third visit following evaluation.  Active range of motion has improved to -2 degrees extension, 119 degrees flexion. She was able to initiate quad sets faster today. She received some cueing with gait for knee flexion with toe off. She was able to add leg press without complaint. Will continue to progress patient towards goals as tolerated.    Penny is progressing well towards her goals.   Patient prognosis is Good.     Patient will continue to benefit from skilled outpatient physical therapy to address the deficits listed in the problem list box on initial evaluation, provide pt/family education and to maximize pt's level of independence in the home and community environment.     Patient's  spiritual, cultural and educational needs considered and pt agreeable to plan of care and goals.     Anticipated barriers to physical therapy: none    SHORT TERM GOALS  1.  Patient to be independent with home exercise program to facilitate carryover between therapy visits.  2.  Patient will have 0-130 degrees range of motion left knee.  2.  Patient will perform straight leg raise without quad lag for increased quad control.  3.  Patient will increase manual muscle test of left lower extremity to 4+ to 5/5 for increased stability with gait and activiites of daily living.     LONG TERM GOALS  1.  Patient will go up/down stairs reciprocal pattern without handrails and good eccentric control on left lower extremity.  2.  Patient will ambulate independent without crutches, without deviation and without pain.  3.  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     Plan     Plan of care Certification: 10/4/2023 to 11/17/2023.     Outpatient Physical Therapy 2 times weekly for 6 weeks to include the following interventions: Electrical Stimulation NMES, Gait Training, Manual Therapy, Moist Heat/ Ice, Neuromuscular Re-ed, Patient Education, Therapeutic Activities, and Therapeutic Exercise.     Tamiko Tate, PTA    10/16/2023

## 2023-10-19 ENCOUNTER — CLINICAL SUPPORT (OUTPATIENT)
Dept: REHABILITATION | Facility: HOSPITAL | Age: 58
End: 2023-10-19
Payer: COMMERCIAL

## 2023-10-19 DIAGNOSIS — Z98.890 S/P ORIF (OPEN REDUCTION INTERNAL FIXATION) FRACTURE: ICD-10-CM

## 2023-10-19 DIAGNOSIS — M25.662 DECREASED ROM OF LEFT KNEE: Primary | ICD-10-CM

## 2023-10-19 DIAGNOSIS — Z87.81 S/P ORIF (OPEN REDUCTION INTERNAL FIXATION) FRACTURE: ICD-10-CM

## 2023-10-19 DIAGNOSIS — R26.9 GAIT DISTURBANCE: ICD-10-CM

## 2023-10-19 PROCEDURE — 97140 MANUAL THERAPY 1/> REGIONS: CPT | Mod: CQ

## 2023-10-19 PROCEDURE — 97112 NEUROMUSCULAR REEDUCATION: CPT | Mod: CQ

## 2023-10-19 PROCEDURE — 97530 THERAPEUTIC ACTIVITIES: CPT | Mod: CQ

## 2023-10-19 NOTE — PROGRESS NOTES
[facility]   Physical Therapy Treatment Note      Name: Penny Murphy  Clinic Number: [id number]    Therapy Diagnosis:   Encounter Diagnoses   Name Primary?    Decreased ROM of left knee Yes    Gait disturbance     S/P ORIF (open reduction internal fixation) fracture      Physician: Woo Liu MD    Visit Date: 10/19/2023    Physician Orders: PT Eval and Treat   Medical Diagnosis from Referral: see above  Evaluation Date: 10/4/2023  Authorization Period Expiration: 1/3/2024  Plan of Care Expiration: 11/17/2023     Date of Surgery: 8/24/2023  Visit # / Visits authorized: 5/13   FOTO: 1/ 3     Precautions: Standard      Time In: 1:37 pm  Time Out: 2:33 pm  Total Billable Time: 48 minutes    PTA Visit #: 2      Subjective     Patient reports: she feels like the more she exercises, the stiffer her knee gets.  She was compliant with home exercise program.  Response to previous treatment: no complaints from evaluation   Functional change: walking some without crutches at home    Pain: 0/10  Location: left knee      Objective      Objective Measures updated at progress report unless specified.   Active range of motion: -3 degrees extension, 118 degrees flexion     Treatment     Penny received the treatments listed below:      therapeutic exercises to develop strength, endurance, ROM, and flexibility for 18 minutes including:  Bike x 5 minutes   Slant board stretch x 2 minutes  Seated hamstring curls 4 plates x 30 - bilateral   Seated hamstring curls 1 plate x 20 - left  Hamstring stretch in Cybex 3 x 30 second hold w/ manual assist    manual therapy techniques: Joint mobilizations, Myofacial release, Soft tissue Mobilization, and manual stretching were applied for 8 minutes, including:  Overpressure into extension  Passive heel lifts w/ 5 second holds  Hamstring stretching in supine  Patellar mobs    neuromuscular re-education activities to improve: Balance, Coordination, Proprioception,  and quad control for 22 minutes. The following activities were included:  Quad set 5 second hold x 20 (w/ towel roll)  Short arc quad 5 second hold x 20  Straight leg raise 2 x 10  Prone quad set 5 second hold   Quad sets, straight leg raises, and short arc quads with neuromuscular electrical stimulation   Closed chain terminal knee extension 3 plates 5 second hold x 20    therapeutic activities to improve functional performance for 8 minutes, including:  Bilateral leg press 5 plates x 30  Left leg press 3 plates x 20    gait training to improve functional mobility and safety for 0 minutes, including:  Correcting gait with unilateral crutch so that patient has it on right side.    direct contact modalities after being cleared for contraindications:     supervised modalities after being cleared for contradictions: neuromuscular electrical stimulation with [place] current x 12 minutes to improve quad awareness and strengthen in end range. She performed quad sets, straight leg raise, and short arc quad during this time. No adverse reaction noted.      Patient Education and Home Exercises       Education provided:   - cues to not co-contract glutes and hamstrings with quad set     Written Home Exercises Provided: Patient instructed to cont prior HEP. Exercises were reviewed and Penny was able to demonstrate them prior to the end of the session.  Penny demonstrated good  understanding of the education provided. See Electronic Medical Record under Patient Instructions for exercises provided during therapy sessions    Assessment     Penny arrived for her fourth visit following evaluation.  Active range of motion at beginning of treatment was -3 degrees extension, 118 degrees flexion. She continues to cocontract with quad sets so added neuromuscular electrical stimulation to left quad. Will continue to progress patient towards goals as tolerated.    Penny is progressing well towards her goals.   Patient prognosis is Good.      Patient will continue to benefit from skilled outpatient physical therapy to address the deficits listed in the problem list box on initial evaluation, provide pt/family education and to maximize pt's level of independence in the home and community environment.     Patient's spiritual, cultural and educational needs considered and pt agreeable to plan of care and goals.     Anticipated barriers to physical therapy: none    SHORT TERM GOALS  1.  Patient to be independent with home exercise program to facilitate carryover between therapy visits.  2.  Patient will have 0-130 degrees range of motion left knee.  2.  Patient will perform straight leg raise without quad lag for increased quad control.  3.  Patient will increase manual muscle test of left lower extremity to 4+ to 5/5 for increased stability with gait and activiites of daily living.     LONG TERM GOALS  1.  Patient will go up/down stairs reciprocal pattern without handrails and good eccentric control on left lower extremity.  2.  Patient will ambulate independent without crutches, without deviation and without pain.  3.  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     Plan     Plan of care Certification: 10/4/2023 to 11/17/2023.     Outpatient Physical Therapy 2 times weekly for 6 weeks to include the following interventions: Electrical Stimulation NMES, Gait Training, Manual Therapy, Moist Heat/ Ice, Neuromuscular Re-ed, Patient Education, Therapeutic Activities, and Therapeutic Exercise.     Tamiko Tate, PTA    10/19/2023

## 2023-10-23 ENCOUNTER — CLINICAL SUPPORT (OUTPATIENT)
Dept: REHABILITATION | Facility: HOSPITAL | Age: 58
End: 2023-10-23
Payer: COMMERCIAL

## 2023-10-23 DIAGNOSIS — M25.662 DECREASED ROM OF LEFT KNEE: Primary | ICD-10-CM

## 2023-10-23 DIAGNOSIS — Z87.81 S/P ORIF (OPEN REDUCTION INTERNAL FIXATION) FRACTURE: ICD-10-CM

## 2023-10-23 DIAGNOSIS — R26.9 GAIT DISTURBANCE: ICD-10-CM

## 2023-10-23 DIAGNOSIS — Z98.890 S/P ORIF (OPEN REDUCTION INTERNAL FIXATION) FRACTURE: ICD-10-CM

## 2023-10-23 DIAGNOSIS — S82.142D CLOSED FRACTURE OF LEFT TIBIAL PLATEAU WITH ROUTINE HEALING, SUBSEQUENT ENCOUNTER: ICD-10-CM

## 2023-10-23 PROCEDURE — 97530 THERAPEUTIC ACTIVITIES: CPT

## 2023-10-23 PROCEDURE — 97110 THERAPEUTIC EXERCISES: CPT

## 2023-10-23 PROCEDURE — 97112 NEUROMUSCULAR REEDUCATION: CPT

## 2023-10-23 PROCEDURE — 97140 MANUAL THERAPY 1/> REGIONS: CPT

## 2023-10-23 NOTE — PROGRESS NOTES
[facility]   Physical Therapy Treatment Note      Name: Penny Murphy  Clinic Number: [id number]    Therapy Diagnosis:   Encounter Diagnoses   Name Primary?    Decreased ROM of left knee Yes    Gait disturbance     Closed fracture of left tibial plateau with routine healing, subsequent encounter     S/P ORIF (open reduction internal fixation) fracture      Physician: Woo Liu MD    Visit Date: 10/23/2023    Physician Orders: PT Eval and Treat   Medical Diagnosis from Referral: see above  Evaluation Date: 10/4/2023  Authorization Period Expiration: 1/3/2024  Plan of Care Expiration: 11/17/2023     Date of Surgery: 8/24/2023  Visit # / Visits authorized: 6/13   FOTO: 1/3 = 40              2/3 = 55     Precautions: Standard      Time In: 1:36 pm  Time Out: 2:38 pm  Total Billable Time: 56 minutes    PTA Visit #:       Subjective     Patient reports: knee is stiff  She was compliant with home exercise program.  Response to previous treatment: no complaints from evaluation   Functional change: arrived without crutches today    Pain: 0/10  Location: left knee      Objective       Active range of motion: -10 degrees extension upon arrival (10/23), 127 degrees flexion     Treatment     Penny received the treatments listed below:      therapeutic exercises to develop strength, endurance, ROM, and flexibility for 18 minutes including:  Bike x 5 minutes   Slant board stretch x 2 minutes  Seated hamstring curls 4 plates x 30 - bilateral   Seated hamstring curls 1 plate x 20 - left  Hamstring stretch in Cybex 3 x 30 second hold w/ manual assist  Prone hangs 0# x 1 minute, 5# - 3 x 1 minute each    manual therapy techniques: Joint mobilizations, Myofacial release, Soft tissue Mobilization, and manual stretching were applied for 8 minutes, including:  Overpressure into extension  Passive heel lifts w/ 5 second holds  Hamstring stretching in supine  Patellar mobs    neuromuscular re-education  activities to improve: Balance, Coordination, Proprioception, and quad control for 22 minutes. The following activities were included:  Quad set 5 second hold- (w/ towel roll)  Short arc quad 5 second hold -  Straight leg raise 3 second hold x 20  Prone quad set 10 second hold x 10  Quad sets, straight leg raises, and short arc quads with neuromuscular electrical stimulation   Closed chain terminal knee extension 4 plates 5 second hold x 20    therapeutic activities to improve functional performance for 8 minutes, including:  Bilateral leg press 6 plates x 30 - w/ heel rock for terminal knee extension   Left leg press 3 plates x 20 - w/ heel rock for terminal knee extension     gait training to improve functional mobility and safety for 0 minutes, including:  Correcting gait with unilateral crutch so that patient has it on right side.    direct contact modalities after being cleared for contraindications:     supervised modalities after being cleared for contradictions: neuromuscular electrical stimulation with Syrian current x 0 minutes to improve quad awareness and strengthen in end range. She performed quad sets, straight leg raise, and short arc quad during this time. No adverse reaction noted.      Patient Education and Home Exercises       Education provided:   - cues to not co-contract glutes and hamstrings with quad set     Written Home Exercises Provided: Patient instructed to cont prior HEP. Exercises were reviewed and Penny was able to demonstrate them prior to the end of the session.  Penny demonstrated good  understanding of the education provided. See Electronic Medical Record under Patient Instructions for exercises provided during therapy sessions    Assessment     Penny arrived missing 10 degrees of knee extension today. After prone hangs, overpressure into extension and passive heel lifts we are able to get to -2 degrees and she can then get to 0 degrees with quad set. She continues to cocontract  with quad sets - providing verbal and tactile cues. She is no longer using a crutch for ambulation. Will continue to progress patient towards goals as tolerated.    Penny is progressing well towards her goals.   Patient prognosis is Good.     Patient will continue to benefit from skilled outpatient physical therapy to address the deficits listed in the problem list box on initial evaluation, provide pt/family education and to maximize pt's level of independence in the home and community environment.     Patient's spiritual, cultural and educational needs considered and pt agreeable to plan of care and goals.     Anticipated barriers to physical therapy: none    SHORT TERM GOALS  1.  Patient to be independent with home exercise program to facilitate carryover between therapy visits.  2.  Patient will have 0-130 degrees range of motion left knee.  2.  Patient will perform straight leg raise without quad lag for increased quad control.  3.  Patient will increase manual muscle test of left lower extremity to 4+ to 5/5 for increased stability with gait and activiites of daily living.     LONG TERM GOALS  1.  Patient will go up/down stairs reciprocal pattern without handrails and good eccentric control on left lower extremity.  2.  Patient will ambulate independent without crutches, without deviation and without pain.  3.  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     Plan     Plan of care Certification: 10/4/2023 to 11/17/2023.     Outpatient Physical Therapy 2 times weekly for 6 weeks to include the following interventions: Electrical Stimulation NMES, Gait Training, Manual Therapy, Moist Heat/ Ice, Neuromuscular Re-ed, Patient Education, Therapeutic Activities, and Therapeutic Exercise.     DAIN HUYNH, PT    10/23/2023

## 2023-10-26 ENCOUNTER — CLINICAL SUPPORT (OUTPATIENT)
Dept: REHABILITATION | Facility: HOSPITAL | Age: 58
End: 2023-10-26
Payer: COMMERCIAL

## 2023-10-26 DIAGNOSIS — M25.662 DECREASED ROM OF LEFT KNEE: Primary | ICD-10-CM

## 2023-10-26 DIAGNOSIS — Z98.890 S/P ORIF (OPEN REDUCTION INTERNAL FIXATION) FRACTURE: ICD-10-CM

## 2023-10-26 DIAGNOSIS — Z87.81 S/P ORIF (OPEN REDUCTION INTERNAL FIXATION) FRACTURE: ICD-10-CM

## 2023-10-26 DIAGNOSIS — R26.9 GAIT DISTURBANCE: ICD-10-CM

## 2023-10-26 PROCEDURE — 97140 MANUAL THERAPY 1/> REGIONS: CPT | Mod: CQ

## 2023-10-26 PROCEDURE — 97530 THERAPEUTIC ACTIVITIES: CPT | Mod: CQ

## 2023-10-26 PROCEDURE — 97110 THERAPEUTIC EXERCISES: CPT | Mod: CQ

## 2023-10-26 PROCEDURE — 97014 ELECTRIC STIMULATION THERAPY: CPT | Mod: CQ

## 2023-10-26 NOTE — PROGRESS NOTES
[facility]   Physical Therapy Treatment Note      Name: Penny Murphy  Clinic Number: [id number]    Therapy Diagnosis:   Encounter Diagnoses   Name Primary?    Decreased ROM of left knee Yes    Gait disturbance     S/P ORIF (open reduction internal fixation) fracture      Physician: Woo Liu MD    Visit Date: 10/26/2023    Physician Orders: PT Eval and Treat   Medical Diagnosis from Referral: see above  Evaluation Date: 10/4/2023  Authorization Period Expiration: 1/3/2024  Plan of Care Expiration: 11/17/2023     Date of Surgery: 8/24/2023  Visit # / Visits authorized: 7/13   FOTO: 1/3 = 40              2/3 = 55     Precautions: Standard      Time In: 1:45 pm  Time Out: 2:35 pm  Total Billable Time: 44 minutes    PTA Visit #: 1      Subjective     Patient reports: she is still doing well, just very tender in posterior knee and has numbness in anterior knee.  She was compliant with home exercise program.  Response to previous treatment: no complaints    Functional change: arrived without crutches today    Pain: 0/10  Location: left knee      Objective       Active range of motion: -5 degrees extension upon arrival (10/26), 125 degrees flexion on arrival    Treatment     Penny received the treatments listed below:      therapeutic exercises to develop strength, endurance, ROM, and flexibility for 18 minutes including:  Bike x 5 minutes   Slant board stretch x 2 minutes  Seated hamstring curls 4 plates x 30 - bilateral   Seated hamstring curls 1 plate   Hamstring stretch in Cybex 3 x 30 second hold w/ manual assist  Prone hangs 0# x 1 minute, 5# - 3 x 1 minute each (not performed today)     manual therapy techniques: Joint mobilizations, Myofacial release, Soft tissue Mobilization, and manual stretching were applied for 8 minutes, including:  Overpressure into extension  Passive heel lifts w/ 5 second holds  Hamstring stretching in supine  Patellar mobs    neuromuscular re-education  activities to improve: Balance, Coordination, Proprioception, and quad control for 10 minutes. The following activities were included:  Prone quad set 10 second hold  Quad sets, straight leg raises, and short arc quads with neuromuscular electrical stimulation   Closed chain terminal knee extension 4 plates 5 second hold x 0    therapeutic activities to improve functional performance for 8 minutes, including:  Bilateral leg press 6 plates x 30 - w/ heel rock for terminal knee extension   Left leg press 3 plates x 20 - w/ heel rock for terminal knee extension     gait training to improve functional mobility and safety for 0 minutes, including:  Correcting gait with unilateral crutch so that patient has it on right side.    direct contact modalities after being cleared for contraindications:     supervised modalities after being cleared for contradictions: neuromuscular electrical stimulation with [place] current x 10 minutes to improve quad awareness and strengthen in end range. She performed quad sets, straight leg raise, and short arc quad during this time. No adverse reaction noted.      Patient Education and Home Exercises       Education provided:   - cues to not co-contract glutes and hamstrings with quad set     Written Home Exercises Provided: Patient instructed to cont prior HEP. Exercises were reviewed and Penny was able to demonstrate them prior to the end of the session.  Penny demonstrated good  understanding of the education provided. See Electronic Medical Record under Patient Instructions for exercises provided during therapy sessions    Assessment     Penny arrived with complaint of stiffness in knee.  Active range of motion at beginning of treatment was -5 degrees extension, 125 degrees flexion. She continues to cocontract with quad sets so repeated neuromuscular electrical stimulation to left quad. She was tender to posterior knee with palpation. Will continue to progress patient towards goals as  chris Francis is progressing well towards her goals.   Patient prognosis is Good.     Patient will continue to benefit from skilled outpatient physical therapy to address the deficits listed in the problem list box on initial evaluation, provide pt/family education and to maximize pt's level of independence in the home and community environment.     Patient's spiritual, cultural and educational needs considered and pt agreeable to plan of care and goals.     Anticipated barriers to physical therapy: none    SHORT TERM GOALS  1.  Patient to be independent with home exercise program to facilitate carryover between therapy visits.  2.  Patient will have 0-130 degrees range of motion left knee.  2.  Patient will perform straight leg raise without quad lag for increased quad control.  3.  Patient will increase manual muscle test of left lower extremity to 4+ to 5/5 for increased stability with gait and activiites of daily living.     LONG TERM GOALS  1.  Patient will go up/down stairs reciprocal pattern without handrails and good eccentric control on left lower extremity.  2.  Patient will ambulate independent without crutches, without deviation and without pain.  3.  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     Plan     Plan of care Certification: 10/4/2023 to 11/17/2023.     Outpatient Physical Therapy 2 times weekly for 6 weeks to include the following interventions: Electrical Stimulation NMES, Gait Training, Manual Therapy, Moist Heat/ Ice, Neuromuscular Re-ed, Patient Education, Therapeutic Activities, and Therapeutic Exercise.     Tamiko Tate, PTA    10/26/2023

## 2023-10-30 ENCOUNTER — CLINICAL SUPPORT (OUTPATIENT)
Dept: REHABILITATION | Facility: HOSPITAL | Age: 58
End: 2023-10-30
Payer: COMMERCIAL

## 2023-10-30 DIAGNOSIS — Z87.81 S/P ORIF (OPEN REDUCTION INTERNAL FIXATION) FRACTURE: ICD-10-CM

## 2023-10-30 DIAGNOSIS — R26.9 GAIT DISTURBANCE: ICD-10-CM

## 2023-10-30 DIAGNOSIS — S82.142D CLOSED FRACTURE OF LEFT TIBIAL PLATEAU WITH ROUTINE HEALING, SUBSEQUENT ENCOUNTER: ICD-10-CM

## 2023-10-30 DIAGNOSIS — Z98.890 S/P ORIF (OPEN REDUCTION INTERNAL FIXATION) FRACTURE: ICD-10-CM

## 2023-10-30 DIAGNOSIS — M25.662 DECREASED ROM OF LEFT KNEE: Primary | ICD-10-CM

## 2023-10-30 DIAGNOSIS — S82.142D CLOSED FRACTURE OF LEFT TIBIAL PLATEAU WITH ROUTINE HEALING, SUBSEQUENT ENCOUNTER: Primary | ICD-10-CM

## 2023-10-30 PROCEDURE — 97140 MANUAL THERAPY 1/> REGIONS: CPT

## 2023-10-30 PROCEDURE — 97530 THERAPEUTIC ACTIVITIES: CPT

## 2023-10-30 PROCEDURE — 97112 NEUROMUSCULAR REEDUCATION: CPT

## 2023-10-30 NOTE — PROGRESS NOTES
[facility]   Physical Therapy Treatment Note      Name: Penny Murphy  Clinic Number: [id number]    Therapy Diagnosis:   Encounter Diagnoses   Name Primary?    Decreased ROM of left knee Yes    Gait disturbance     Closed fracture of left tibial plateau with routine healing, subsequent encounter     S/P ORIF (open reduction internal fixation) fracture      Physician: Woo Liu MD    Visit Date: 10/30/2023    Physician Orders: PT Eval and Treat   Medical Diagnosis from Referral: see above  Evaluation Date: 10/4/2023  Authorization Period Expiration: 1/3/2024  Plan of Care Expiration: 11/17/2023     Date of Surgery: 8/24/2023  Visit # / Visits authorized: 8/13   FOTO: 1/3 = 40              2/3 = 55     Precautions: Standard      Time In: 1:40 pm  Time Out: 2:30 pm  Total Billable Time: 49 minutes    PTA Visit #:       Subjective     Patient reports: she is stiff today  She was compliant with home exercise program.  Response to previous treatment: no complaints    Functional change: arrived without crutches today    Pain: 0/10  Location: left knee      Objective       Active range of motion: -2 degrees extension upon arrival (10/30), 125 degrees flexion on arrival    Treatment     Penny received the treatments listed below:      therapeutic exercises to develop strength, endurance, ROM, and flexibility for 15 minutes including:  Bike x 5 minutes   Slant board stretch x 3 minutes  Seated hamstring curls 5 plates x 30 - bilateral   Seated hamstring curls 1 plate (not today)  Hamstring stretch in Cybex 3 x 30 second hold w/ manual assist  Prone hangs 0# x 1 minute, 5# - 3 x 1 minute each (not performed today)     manual therapy techniques: Joint mobilizations, Myofacial release, Soft tissue Mobilization, and manual stretching were applied for 12 minutes, including:  Overpressure into extension  Passive heel lifts w/ 5 second holds  Hamstring stretching in supine  Patellar  "jonis    neuromuscular re-education activities to improve: Balance, Coordination, Proprioception, and quad control for 10 minutes. The following activities were included:  Prone quad set -  Quad sets 10 x 10 second hold  Straight leg raise 3 x 10  Short arc quad 5 second hold x 20   Closed chain terminal knee extension 4 plates 5 second hold -    therapeutic activities to improve functional performance for 12 minutes, including:  Bilateral leg press 6 plates x 30 - w/ heel rock for terminal knee extension   Left leg press 4 plates x 30 - w/ heel rock for terminal knee extension   Lateral step downs 4" x 20    gait training to improve functional mobility and safety for 0 minutes, including:  Correcting gait with unilateral crutch so that patient has it on right side.    direct contact modalities after being cleared for contraindications:     supervised modalities after being cleared for contradictions: neuromuscular electrical stimulation with [place] current x 0 minutes to improve quad awareness and strengthen in end range. She performed quad sets, straight leg raise, and short arc quad during this time. No adverse reaction noted.      Patient Education and Home Exercises       Education provided:   - cues to not co-contract glutes and hamstrings with quad set     Written Home Exercises Provided: Patient instructed to cont prior HEP. Exercises were reviewed and Penny was able to demonstrate them prior to the end of the session.  Penny demonstrated good  understanding of the education provided. See Electronic Medical Record under Patient Instructions for exercises provided during therapy sessions    Assessment     Penny arrived with complaint of stiffness in knee.  Active range of motion at beginning of treatment was -2 degrees extension, 125 degrees flexion. She continues to cocontract with quad sets so placed towel roll under knee for feedback. Added lateral step downs from 4" height today to work on eccentric quad " control. Her quad was a little shaky with this but she was able to control the movement. Will continue to progress patient towards goals as tolerated.    Penny is progressing well towards her goals.   Patient prognosis is Good.     Patient will continue to benefit from skilled outpatient physical therapy to address the deficits listed in the problem list box on initial evaluation, provide pt/family education and to maximize pt's level of independence in the home and community environment.     Patient's spiritual, cultural and educational needs considered and pt agreeable to plan of care and goals.     Anticipated barriers to physical therapy: none    SHORT TERM GOALS  1.  Patient to be independent with home exercise program to facilitate carryover between therapy visits.  2.  Patient will have 0-130 degrees range of motion left knee.  2.  Patient will perform straight leg raise without quad lag for increased quad control.  3.  Patient will increase manual muscle test of left lower extremity to 4+ to 5/5 for increased stability with gait and activiites of daily living.     LONG TERM GOALS  1.  Patient will go up/down stairs reciprocal pattern without handrails and good eccentric control on left lower extremity.  2.  Patient will ambulate independent without crutches, without deviation and without pain.  3.  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     Plan     Plan of care Certification: 10/4/2023 to 11/17/2023.     Outpatient Physical Therapy 2 times weekly for 6 weeks to include the following interventions: Electrical Stimulation NMES, Gait Training, Manual Therapy, Moist Heat/ Ice, Neuromuscular Re-ed, Patient Education, Therapeutic Activities, and Therapeutic Exercise.     DAIN HUYNH, PT    10/30/2023

## 2023-11-01 ENCOUNTER — HOSPITAL ENCOUNTER (OUTPATIENT)
Dept: RADIOLOGY | Facility: HOSPITAL | Age: 58
Discharge: HOME | End: 2023-11-01
Attending: ORTHOPAEDIC SURGERY
Payer: COMMERCIAL

## 2023-11-01 ENCOUNTER — OFFICE VISIT (OUTPATIENT)
Dept: ORTHOPEDICS | Facility: CLINIC | Age: 58
End: 2023-11-01
Payer: COMMERCIAL

## 2023-11-01 VITALS — BODY MASS INDEX: 26.22 KG/M2 | WEIGHT: 173 LBS | HEIGHT: 68 IN

## 2023-11-01 DIAGNOSIS — S82.142D CLOSED FRACTURE OF LEFT TIBIAL PLATEAU WITH ROUTINE HEALING, SUBSEQUENT ENCOUNTER: Primary | ICD-10-CM

## 2023-11-01 DIAGNOSIS — S82.142D CLOSED FRACTURE OF LEFT TIBIAL PLATEAU WITH ROUTINE HEALING, SUBSEQUENT ENCOUNTER: ICD-10-CM

## 2023-11-01 PROCEDURE — 99024 PR POST-OP FOLLOW-UP VISIT: ICD-10-PCS | Mod: ,,, | Performed by: ORTHOPAEDIC SURGERY

## 2023-11-01 PROCEDURE — 99213 OFFICE O/P EST LOW 20 MIN: CPT | Mod: PBBFAC | Performed by: ORTHOPAEDIC SURGERY

## 2023-11-01 PROCEDURE — 73590 X-RAY EXAM OF LOWER LEG: CPT | Mod: 26,LT,, | Performed by: ORTHOPAEDIC SURGERY

## 2023-11-01 PROCEDURE — 1159F MED LIST DOCD IN RCRD: CPT | Mod: CPTII,,, | Performed by: ORTHOPAEDIC SURGERY

## 2023-11-01 PROCEDURE — 1159F PR MEDICATION LIST DOCUMENTED IN MEDICAL RECORD: ICD-10-PCS | Mod: CPTII,,, | Performed by: ORTHOPAEDIC SURGERY

## 2023-11-01 PROCEDURE — 73590 XR TIBIA FIBULA 2 VIEW LEFT: ICD-10-PCS | Mod: 26,LT,, | Performed by: ORTHOPAEDIC SURGERY

## 2023-11-01 PROCEDURE — 73590 X-RAY EXAM OF LOWER LEG: CPT | Mod: TC,LT

## 2023-11-01 PROCEDURE — 99024 POSTOP FOLLOW-UP VISIT: CPT | Mod: ,,, | Performed by: ORTHOPAEDIC SURGERY

## 2023-11-01 NOTE — PROGRESS NOTES
Patient is here for follow-up of left tibial plateau.  Her x-rays show she has healing callus present.  I will let her weightbear as tolerates.  She has good motion.  I will follow back up in 4 weeks.  She is doing well.

## 2023-11-01 NOTE — PROGRESS NOTES
Radiology Interpretation        Patient Name: Penny Murphy  Date: 11/1/2023  YOB: 1965  MRN# [id number]        ORDERING DIAGNOSIS:    Encounter Diagnosis   Name Primary?    Closed fracture of left tibial plateau with routine healing, subsequent encounter Yes        AP lateral left tib-fib skeletally mature individual normal mineralization there is a healing fracture lateral tibial plateau plate screws in place no shift alignment no bony lesions impression fracture left tibial plateau minimal displacement callus present hardware in place               Woo Liu MD

## 2023-11-02 ENCOUNTER — CLINICAL SUPPORT (OUTPATIENT)
Dept: REHABILITATION | Facility: HOSPITAL | Age: 58
End: 2023-11-02
Payer: COMMERCIAL

## 2023-11-02 DIAGNOSIS — R26.9 GAIT DISTURBANCE: ICD-10-CM

## 2023-11-02 DIAGNOSIS — M25.662 DECREASED ROM OF LEFT KNEE: Primary | ICD-10-CM

## 2023-11-02 DIAGNOSIS — Z98.890 S/P ORIF (OPEN REDUCTION INTERNAL FIXATION) FRACTURE: ICD-10-CM

## 2023-11-02 DIAGNOSIS — Z87.81 S/P ORIF (OPEN REDUCTION INTERNAL FIXATION) FRACTURE: ICD-10-CM

## 2023-11-02 DIAGNOSIS — S82.142D CLOSED FRACTURE OF LEFT TIBIAL PLATEAU WITH ROUTINE HEALING, SUBSEQUENT ENCOUNTER: ICD-10-CM

## 2023-11-02 PROCEDURE — 97110 THERAPEUTIC EXERCISES: CPT

## 2023-11-02 PROCEDURE — 97530 THERAPEUTIC ACTIVITIES: CPT

## 2023-11-02 PROCEDURE — 97112 NEUROMUSCULAR REEDUCATION: CPT

## 2023-11-02 PROCEDURE — 97140 MANUAL THERAPY 1/> REGIONS: CPT

## 2023-11-02 NOTE — PROGRESS NOTES
[facility]   Physical Therapy Treatment Note      Name: Penny Murphy  Clinic Number: [id number]    Therapy Diagnosis:   Encounter Diagnoses   Name Primary?    Decreased ROM of left knee Yes    Gait disturbance     Closed fracture of left tibial plateau with routine healing, subsequent encounter     S/P ORIF (open reduction internal fixation) fracture      Physician: Woo Liu MD    Visit Date: 11/2/2023    Physician Orders: PT Eval and Treat   Medical Diagnosis from Referral: see above  Evaluation Date: 10/4/2023  Authorization Period Expiration: 1/3/2024  Plan of Care Expiration: 11/17/2023     Date of Surgery: 8/24/2023  Visit # / Visits authorized: 9/13   FOTO: 1/3 = 40              2/3 = 55     Precautions: Standard      Time In: 1:45 pm  Time Out: 2:50 pm  Total Billable Time: 60 minutes    PTA Visit #:       Subjective     Patient reports: no complaints today  She was compliant with home exercise program.  Response to previous treatment: no complaints    Functional change: arrived without crutches today    Pain: 0/10  Location: left knee      Objective       Active range of motion: -3 degrees extension upon arrival (11/2), 125 degrees flexion on arrival    Treatment     Penny received the treatments listed below:      therapeutic exercises to develop strength, endurance, ROM, and flexibility for 18 minutes including:  Bike x 5 minutes   Slant board stretch x 3 minutes  Seated hamstring curls 5 plates x 30 - bilateral   Hamstring stretch in Cybex 3 x 30 second hold w/ manual assist  Ideal stretch 4 x 20 second hold   Prone hangs 0# x 1 minute, 5# - 3 x 1 minute each (not performed today)     manual therapy techniques: Joint mobilizations, Myofacial release, Soft tissue Mobilization, and manual stretching were applied for 12 minutes, including:  Overpressure into extension  Patellar mobs  Prone quad stretch    (Not today)  Passive heel lifts w/ 5 second holds  Hamstring  "stretching in supine    neuromuscular re-education activities to improve: Balance, Coordination, Proprioception, and quad control for 15 minutes. The following activities were included:  Prone quad set -  Quad sets -  Straight leg raise w/ strap 5 second hold x 30  Terminal knee extension 5 second hold x 20   Closed chain terminal knee extension 4 plates 5 second hold x 20    therapeutic activities to improve functional performance for 15 minutes, including:  Bilateral leg press 6 plates x 30 - w/ heel rock for terminal knee extension   Left leg press 4 plates x 30 - w/ heel rock for terminal knee extension   Lateral step downs 4" x 20    gait training to improve functional mobility and safety for 0 minutes, including:  Correcting gait with unilateral crutch so that patient has it on right side.    direct contact modalities after being cleared for contraindications:     supervised modalities after being cleared for contradictions: neuromuscular electrical stimulation with Vincentian current x 0 minutes to improve quad awareness and strengthen in end range. She performed quad sets, straight leg raise, and short arc quad during this time. No adverse reaction noted.      Patient Education and Home Exercises       Education provided:   - cues to not co-contract glutes and hamstrings with quad set     Written Home Exercises Provided: Patient instructed to cont prior HEP. Exercises were reviewed and Penny was able to demonstrate them prior to the end of the session.  Penny demonstrated good  understanding of the education provided. See Electronic Medical Record under Patient Instructions for exercises provided during therapy sessions    Assessment     Penny continues to have 2-3 degree extension lag. We are focusing on terminal knee extension to strengthen quad into this range. She did well with lateral step downs today. Had her use strap with straight leg raise to get knee into that end range of extension. She had a better " quad set with less co-contraction today. Will continue to progress patient towards goals as tolerated.    Penny is progressing well towards her goals.   Patient prognosis is Good.     Patient will continue to benefit from skilled outpatient physical therapy to address the deficits listed in the problem list box on initial evaluation, provide pt/family education and to maximize pt's level of independence in the home and community environment.     Patient's spiritual, cultural and educational needs considered and pt agreeable to plan of care and goals.     Anticipated barriers to physical therapy: none    SHORT TERM GOALS  1.  Patient to be independent with home exercise program to facilitate carryover between therapy visits. MET  2.  Patient will have 0-130 degrees range of motion left knee. 3-125 degrees   2.  Patient will perform straight leg raise without quad lag for increased quad control. NOT MET  3.  Patient will increase manual muscle test of left lower extremity to 4+ to 5/5 for increased stability with gait and activiites of daily living. NOT MET/ONGOING     LONG TERM GOALS  1.  Patient will go up/down stairs reciprocal pattern without handrails and good eccentric control on left lower extremity. PARTIALLY MET - able to go up and down 4 steps reciprocally using hand rails - still lacks good quad control descending on left lower extremity   2.  Patient will ambulate independent without crutches, without deviation and without pain. PARTIALLY MET - ambulating without assistive device but still has some mild antalgia due to lack of full knee extension on left lower extremity and still has some mild pain at times  3.  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     Outpatient Physical Therapy 2 times weekly for 6 weeks to include the following interventions: Electrical Stimulation  NMES, Gait Training, Manual Therapy, Moist Heat/ Ice, Neuromuscular Re-ed, Patient Education, Therapeutic Activities, and Therapeutic Exercise.     DAIN HUYNH, PT    11/02/2023

## 2023-11-07 ENCOUNTER — DOCUMENTATION ONLY (OUTPATIENT)
Dept: REHABILITATION | Facility: HOSPITAL | Age: 58
End: 2023-11-07

## 2023-11-07 ENCOUNTER — HOSPITAL ENCOUNTER (OUTPATIENT)
Dept: RADIOLOGY | Facility: HOSPITAL | Age: 58
Discharge: HOME | End: 2023-11-07
Attending: NURSE PRACTITIONER
Payer: COMMERCIAL

## 2023-11-07 ENCOUNTER — OFFICE VISIT (OUTPATIENT)
Dept: ORTHOPEDICS | Facility: CLINIC | Age: 58
End: 2023-11-07
Payer: COMMERCIAL

## 2023-11-07 VITALS
RESPIRATION RATE: 12 BRPM | BODY MASS INDEX: 26.22 KG/M2 | HEIGHT: 68 IN | TEMPERATURE: 98 F | HEART RATE: 68 BPM | WEIGHT: 173 LBS | OXYGEN SATURATION: 99 %

## 2023-11-07 DIAGNOSIS — M25.562 ACUTE PAIN OF LEFT KNEE: ICD-10-CM

## 2023-11-07 DIAGNOSIS — S82.142D CLOSED FRACTURE OF LEFT TIBIAL PLATEAU WITH ROUTINE HEALING, SUBSEQUENT ENCOUNTER: Primary | ICD-10-CM

## 2023-11-07 DIAGNOSIS — Z98.890 S/P ORIF (OPEN REDUCTION INTERNAL FIXATION) FRACTURE: ICD-10-CM

## 2023-11-07 DIAGNOSIS — S82.142D CLOSED FRACTURE OF LEFT TIBIAL PLATEAU WITH ROUTINE HEALING, SUBSEQUENT ENCOUNTER: ICD-10-CM

## 2023-11-07 DIAGNOSIS — Z87.81 S/P ORIF (OPEN REDUCTION INTERNAL FIXATION) FRACTURE: ICD-10-CM

## 2023-11-07 DIAGNOSIS — M17.12 PRIMARY OSTEOARTHRITIS OF LEFT KNEE: Primary | ICD-10-CM

## 2023-11-07 PROCEDURE — 73590 X-RAY EXAM OF LOWER LEG: CPT | Mod: 26,LT,, | Performed by: RADIOLOGY

## 2023-11-07 PROCEDURE — 1160F PR REVIEW ALL MEDS BY PRESCRIBER/CLIN PHARMACIST DOCUMENTED: ICD-10-PCS | Mod: CPTII,,, | Performed by: NURSE PRACTITIONER

## 2023-11-07 PROCEDURE — 1159F PR MEDICATION LIST DOCUMENTED IN MEDICAL RECORD: ICD-10-PCS | Mod: CPTII,,, | Performed by: NURSE PRACTITIONER

## 2023-11-07 PROCEDURE — 99214 OFFICE O/P EST MOD 30 MIN: CPT | Mod: PBBFAC | Performed by: NURSE PRACTITIONER

## 2023-11-07 PROCEDURE — 73590 X-RAY EXAM OF LOWER LEG: CPT | Mod: TC,LT

## 2023-11-07 PROCEDURE — 3008F BODY MASS INDEX DOCD: CPT | Mod: CPTII,,, | Performed by: NURSE PRACTITIONER

## 2023-11-07 PROCEDURE — 73562 X-RAY EXAM OF KNEE 3: CPT | Mod: 26,LT,, | Performed by: RADIOLOGY

## 2023-11-07 PROCEDURE — 99214 OFFICE O/P EST MOD 30 MIN: CPT | Mod: S$PBB,24,, | Performed by: NURSE PRACTITIONER

## 2023-11-07 PROCEDURE — 73590 XR TIBIA FIBULA 2 VIEW LEFT: ICD-10-PCS | Mod: 26,LT,, | Performed by: RADIOLOGY

## 2023-11-07 PROCEDURE — 73562 XR KNEE AP LAT WITH SUNRISE LEFT: ICD-10-PCS | Mod: 26,LT,, | Performed by: RADIOLOGY

## 2023-11-07 PROCEDURE — 1159F MED LIST DOCD IN RCRD: CPT | Mod: CPTII,,, | Performed by: NURSE PRACTITIONER

## 2023-11-07 PROCEDURE — 99214 PR OFFICE/OUTPT VISIT, EST, LEVL IV, 30-39 MIN: ICD-10-PCS | Mod: S$PBB,24,, | Performed by: NURSE PRACTITIONER

## 2023-11-07 PROCEDURE — 3008F PR BODY MASS INDEX (BMI) DOCUMENTED: ICD-10-PCS | Mod: CPTII,,, | Performed by: NURSE PRACTITIONER

## 2023-11-07 PROCEDURE — 1160F RVW MEDS BY RX/DR IN RCRD: CPT | Mod: CPTII,,, | Performed by: NURSE PRACTITIONER

## 2023-11-07 PROCEDURE — 73562 X-RAY EXAM OF KNEE 3: CPT | Mod: TC,LT
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
## 2023-11-07 NOTE — PATIENT INSTRUCTIONS
Safety guidelines and activity restrictions are discussed with the patient.  Verbalized understanding.  We discussed use of ice, heat, over-the-counter NSAID medications and over-the-counter acetaminophen per label directions.  Verbalized understanding.  Encouraged to continue range-of-motion exercises.  Verbalizes understanding.  If she fails to improve over the next 2 weeks or worsens before 2 weeks she is to call back for scheduling with Dr. Liu or sooner as needed.  Otherwise keep her normally scheduled follow-up with him.

## 2023-11-09 ENCOUNTER — CLINICAL SUPPORT (OUTPATIENT)
Dept: REHABILITATION | Facility: HOSPITAL | Age: 58
End: 2023-11-09
Payer: COMMERCIAL

## 2023-11-09 DIAGNOSIS — M25.662 DECREASED ROM OF LEFT KNEE: Primary | ICD-10-CM

## 2023-11-09 DIAGNOSIS — Z98.890 S/P ORIF (OPEN REDUCTION INTERNAL FIXATION) FRACTURE: ICD-10-CM

## 2023-11-09 DIAGNOSIS — R26.9 GAIT DISTURBANCE: ICD-10-CM

## 2023-11-09 DIAGNOSIS — Z87.81 S/P ORIF (OPEN REDUCTION INTERNAL FIXATION) FRACTURE: ICD-10-CM

## 2023-11-09 DIAGNOSIS — S82.142D CLOSED FRACTURE OF LEFT TIBIAL PLATEAU WITH ROUTINE HEALING, SUBSEQUENT ENCOUNTER: ICD-10-CM

## 2023-11-09 PROCEDURE — 97112 NEUROMUSCULAR REEDUCATION: CPT

## 2023-11-09 PROCEDURE — 97110 THERAPEUTIC EXERCISES: CPT

## 2023-11-09 PROCEDURE — 97016 VASOPNEUMATIC DEVICE THERAPY: CPT
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
## 2023-11-13 ENCOUNTER — CLINICAL SUPPORT (OUTPATIENT)
Dept: REHABILITATION | Facility: HOSPITAL | Age: 58
End: 2023-11-13
Payer: COMMERCIAL

## 2023-11-13 DIAGNOSIS — Z87.81 S/P ORIF (OPEN REDUCTION INTERNAL FIXATION) FRACTURE: ICD-10-CM

## 2023-11-13 DIAGNOSIS — Z98.890 S/P ORIF (OPEN REDUCTION INTERNAL FIXATION) FRACTURE: ICD-10-CM

## 2023-11-13 DIAGNOSIS — M25.662 DECREASED ROM OF LEFT KNEE: Primary | ICD-10-CM

## 2023-11-13 DIAGNOSIS — R26.9 GAIT DISTURBANCE: ICD-10-CM

## 2023-11-13 PROCEDURE — 97530 THERAPEUTIC ACTIVITIES: CPT | Mod: CQ

## 2023-11-13 PROCEDURE — 97112 NEUROMUSCULAR REEDUCATION: CPT | Mod: CQ

## 2023-11-13 PROCEDURE — 97110 THERAPEUTIC EXERCISES: CPT | Mod: CQ

## 2023-11-13 PROCEDURE — 97014 ELECTRIC STIMULATION THERAPY: CPT | Mod: CQ
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
## 2023-11-16 ENCOUNTER — CLINICAL SUPPORT (OUTPATIENT)
Dept: REHABILITATION | Facility: HOSPITAL | Age: 58
End: 2023-11-16
Payer: COMMERCIAL

## 2023-11-16 DIAGNOSIS — Z98.890 S/P ORIF (OPEN REDUCTION INTERNAL FIXATION) FRACTURE: ICD-10-CM

## 2023-11-16 DIAGNOSIS — S82.142D CLOSED FRACTURE OF LEFT TIBIAL PLATEAU WITH ROUTINE HEALING, SUBSEQUENT ENCOUNTER: ICD-10-CM

## 2023-11-16 DIAGNOSIS — Z87.81 S/P ORIF (OPEN REDUCTION INTERNAL FIXATION) FRACTURE: ICD-10-CM

## 2023-11-16 DIAGNOSIS — M25.662 DECREASED ROM OF LEFT KNEE: Primary | ICD-10-CM

## 2023-11-16 DIAGNOSIS — R26.9 GAIT DISTURBANCE: ICD-10-CM

## 2023-11-16 PROCEDURE — 97110 THERAPEUTIC EXERCISES: CPT

## 2023-11-16 PROCEDURE — 97530 THERAPEUTIC ACTIVITIES: CPT

## 2023-11-16 PROCEDURE — 97112 NEUROMUSCULAR REEDUCATION: CPT
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
## 2023-11-20 ENCOUNTER — CLINICAL SUPPORT (OUTPATIENT)
Dept: REHABILITATION | Facility: HOSPITAL | Age: 58
End: 2023-11-20
Payer: COMMERCIAL

## 2023-11-20 DIAGNOSIS — Z87.81 S/P ORIF (OPEN REDUCTION INTERNAL FIXATION) FRACTURE: ICD-10-CM

## 2023-11-20 DIAGNOSIS — M25.662 DECREASED ROM OF LEFT KNEE: Primary | ICD-10-CM

## 2023-11-20 DIAGNOSIS — R26.9 GAIT DISTURBANCE: ICD-10-CM

## 2023-11-20 DIAGNOSIS — Z98.890 S/P ORIF (OPEN REDUCTION INTERNAL FIXATION) FRACTURE: ICD-10-CM

## 2023-11-20 PROCEDURE — 97530 THERAPEUTIC ACTIVITIES: CPT | Mod: CQ

## 2023-11-20 PROCEDURE — 97112 NEUROMUSCULAR REEDUCATION: CPT | Mod: CQ

## 2023-11-20 PROCEDURE — 97110 THERAPEUTIC EXERCISES: CPT | Mod: CQ
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
## 2023-11-22 DIAGNOSIS — S82.142D CLOSED FRACTURE OF LEFT TIBIAL PLATEAU WITH ROUTINE HEALING, SUBSEQUENT ENCOUNTER: Primary | ICD-10-CM

## 2023-11-29 ENCOUNTER — OFFICE VISIT (OUTPATIENT)
Dept: ORTHOPEDICS | Facility: CLINIC | Age: 58
End: 2023-11-29
Payer: COMMERCIAL

## 2023-11-29 ENCOUNTER — HOSPITAL ENCOUNTER (OUTPATIENT)
Dept: RADIOLOGY | Facility: HOSPITAL | Age: 58
Discharge: HOME | End: 2023-11-29
Attending: ORTHOPAEDIC SURGERY
Payer: COMMERCIAL

## 2023-11-29 VITALS — WEIGHT: 173 LBS | BODY MASS INDEX: 26.22 KG/M2 | HEIGHT: 68 IN

## 2023-11-29 DIAGNOSIS — S82.142D CLOSED FRACTURE OF LEFT TIBIAL PLATEAU WITH ROUTINE HEALING, SUBSEQUENT ENCOUNTER: ICD-10-CM

## 2023-11-29 DIAGNOSIS — S82.142D CLOSED FRACTURE OF LEFT TIBIAL PLATEAU WITH ROUTINE HEALING, SUBSEQUENT ENCOUNTER: Primary | ICD-10-CM

## 2023-11-29 PROCEDURE — 1159F MED LIST DOCD IN RCRD: CPT | Mod: CPTII,,, | Performed by: ORTHOPAEDIC SURGERY

## 2023-11-29 PROCEDURE — 99024 POSTOP FOLLOW-UP VISIT: CPT | Mod: ,,, | Performed by: ORTHOPAEDIC SURGERY

## 2023-11-29 PROCEDURE — 73560 X-RAY EXAM OF KNEE 1 OR 2: CPT | Mod: TC,LT

## 2023-11-29 PROCEDURE — 1159F PR MEDICATION LIST DOCUMENTED IN MEDICAL RECORD: ICD-10-PCS | Mod: CPTII,,, | Performed by: ORTHOPAEDIC SURGERY

## 2023-11-29 PROCEDURE — 99213 OFFICE O/P EST LOW 20 MIN: CPT | Mod: PBBFAC | Performed by: ORTHOPAEDIC SURGERY

## 2023-11-29 PROCEDURE — 99024 PR POST-OP FOLLOW-UP VISIT: ICD-10-PCS | Mod: ,,, | Performed by: ORTHOPAEDIC SURGERY

## 2023-11-29 PROCEDURE — 73560 X-RAY EXAM OF KNEE 1 OR 2: CPT | Mod: 26,LT,, | Performed by: ORTHOPAEDIC SURGERY

## 2023-11-29 PROCEDURE — 73560 XR KNEE 1 OR 2 VIEW LEFT: ICD-10-PCS | Mod: 26,LT,, | Performed by: ORTHOPAEDIC SURGERY

## 2023-11-29 NOTE — PROGRESS NOTES
Radiology Interpretation        Patient Name: Penny Murphy  Date: 11/29/2023  YOB: 1965  MRN# [id number]        ORDERING DIAGNOSIS:    Encounter Diagnosis   Name Primary?    Closed fracture of left tibial plateau with routine healing, subsequent encounter Yes        Two views AP lateral left knee skeletally mature individual there is a healed fracture of the left lateral tibial plateau plate screws in place no shift alignment no bony lesions impression healed fracture left tibial plateau               Woo Liu MD

## 2023-11-29 NOTE — PROGRESS NOTES
Patient is here for follow-up of left tibial plateau fracture.  She has good motion of the knee.  Fractures healed.  Let her weightbear as tolerates.  No shift alignment of the hardware.  I will follow as needed.

## 2023-11-30 ENCOUNTER — CLINICAL SUPPORT (OUTPATIENT)
Dept: REHABILITATION | Facility: HOSPITAL | Age: 58
End: 2023-11-30
Payer: COMMERCIAL

## 2023-11-30 DIAGNOSIS — M25.662 DECREASED ROM OF LEFT KNEE: Primary | ICD-10-CM

## 2023-11-30 DIAGNOSIS — R26.9 GAIT DISTURBANCE: ICD-10-CM

## 2023-11-30 DIAGNOSIS — Z98.890 S/P ORIF (OPEN REDUCTION INTERNAL FIXATION) FRACTURE: ICD-10-CM

## 2023-11-30 DIAGNOSIS — S82.142D CLOSED FRACTURE OF LEFT TIBIAL PLATEAU WITH ROUTINE HEALING, SUBSEQUENT ENCOUNTER: ICD-10-CM

## 2023-11-30 DIAGNOSIS — Z87.81 S/P ORIF (OPEN REDUCTION INTERNAL FIXATION) FRACTURE: ICD-10-CM

## 2023-11-30 PROCEDURE — 97112 NEUROMUSCULAR REEDUCATION: CPT

## 2023-11-30 PROCEDURE — 97110 THERAPEUTIC EXERCISES: CPT

## 2023-11-30 PROCEDURE — 97530 THERAPEUTIC ACTIVITIES: CPT
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
## 2023-12-04 ENCOUNTER — CLINICAL SUPPORT (OUTPATIENT)
Dept: REHABILITATION | Facility: HOSPITAL | Age: 58
End: 2023-12-04
Payer: COMMERCIAL

## 2023-12-04 DIAGNOSIS — R26.9 GAIT DISTURBANCE: ICD-10-CM

## 2023-12-04 DIAGNOSIS — Z98.890 S/P ORIF (OPEN REDUCTION INTERNAL FIXATION) FRACTURE: ICD-10-CM

## 2023-12-04 DIAGNOSIS — M25.662 DECREASED ROM OF LEFT KNEE: Primary | ICD-10-CM

## 2023-12-04 DIAGNOSIS — Z87.81 S/P ORIF (OPEN REDUCTION INTERNAL FIXATION) FRACTURE: ICD-10-CM

## 2023-12-04 PROCEDURE — 97530 THERAPEUTIC ACTIVITIES: CPT | Mod: CQ

## 2023-12-04 PROCEDURE — 97110 THERAPEUTIC EXERCISES: CPT | Mod: CQ

## 2023-12-04 PROCEDURE — 97112 NEUROMUSCULAR REEDUCATION: CPT | Mod: CQ

## 2023-12-04 NOTE — PROGRESS NOTES
"    [facility]   Physical Therapy Treatment Note      Name: Penny Murphy  Clinic Number: [id number]    Therapy Diagnosis:   Encounter Diagnoses   Name Primary?    Decreased ROM of left knee Yes    Gait disturbance     S/P ORIF (open reduction internal fixation) fracture      Physician: Woo Liu MD    Visit Date: 12/4/2023    Physician Orders: PT Eval and Treat   Medical Diagnosis from Referral: see above  Evaluation Date: 10/4/2023  Authorization Period Expiration: 1/3/2024  Plan of Care Expiration: 12/15/2023     Date of Surgery: 8/24/2023  Visit # / Visits authorized: 15/19   FOTO: 1/3 = 40              2/3 = 55    3/3 = 49  Precautions: Standard      Time In: 1:40 pm  Time Out: 2:20 pm  Total Billable Time: 40 minutes    PTA Visit #: 1    Subjective     Patient reports: her knee feels "funny" but it is not hurting her.  She was compliant with home exercise program.  Response to previous treatment: no complaints    Functional change: returned to work    Pain: 0/10  Location: left knee      Objective       Active range of motion: -1 degrees extension upon arrival (12/4), 128 degrees flexion on arrival    Treatment     Penny received the treatments listed below:      therapeutic exercises to develop strength, endurance, ROM, and flexibility for 14 minutes including:  Bike x 5 minutes   Slant board stretch x 2 minutes  Seated hamstring curls 4 plates x 30 - bilateral  Hamstring stretch 3 x 30 second hold   Prone quad stretch (w/ manual)    manual therapy techniques: Joint mobilizations, Myofacial release, Soft tissue Mobilization, and manual stretching were applied for 0 minutes, including:  Overpressure into extension  Patellar mobs  Prone quad stretch 3 x 30 seconds     neuromuscular re-education activities to improve: Balance, Coordination, Proprioception, and quad control for 10 minutes. The following activities were included:  Straight leg raise 3 x 10   Terminal knee " "extension 10 second hold x 10   Closed chain terminal knee extension 4 plates 10 second hold x 10     therapeutic activities to improve functional performance for 16 minutes, including:  Bilateral leg press 7 plates x 30 - w/ heel rock for terminal knee extension   Left leg press 4 plates x 30 - w/ heel rock for terminal knee extension   Lateral step downs 4" x 30   Single leg stance on blue foam pad x 2 minutes total    gait training to improve functional mobility and safety for 0 minutes, including:  Encouraging patient to ambulate with more normal and relaxed movements.    direct contact modalities after being cleared for contraindications:     supervised modalities after being cleared for contradictions: neuromuscular electrical stimulation with Lithuanian current x 0 minutes to improve quad awareness and strengthen in end range. She performed quad sets with moist heat in propped extension during this time. No adverse reaction noted.    Game Ready to left knee with medium pressure, coldest setting and lower extremity elevated for 0 minutes to decrease pain and swelling.    Patient Education and Home Exercises       Education provided:   - cues to not co-contract glutes and hamstrings with quad set     Written Home Exercises Provided: Patient instructed to cont prior HEP. Exercises were reviewed and Penny was able to demonstrate them prior to the end of the session.  Penny demonstrated good  understanding of the education provided. See Electronic Medical Record under Patient Instructions for exercises provided during therapy sessions    Assessment     Penny did well with exercises in clinic. She remains tight in extension, needing to stretch a bit before she can reach 0 degrees. Her gait has improved but is still stiff unless cued. She did well with stepups-plan to try 6" next visit.      Penny is progressing well towards her goals.   Patient prognosis is Good.     Patient will continue to benefit from skilled " outpatient physical therapy to address the deficits listed in the problem list box on initial evaluation, provide pt/family education and to maximize pt's level of independence in the home and community environment.     Patient's spiritual, cultural and educational needs considered and pt agreeable to plan of care and goals.     Anticipated barriers to physical therapy: none    SHORT TERM GOALS  1.  Patient to be independent with home exercise program to facilitate carryover between therapy visits. MET  2.  Patient will have 0-130 degrees range of motion left knee. 1-128 degrees   2.  Patient will perform straight leg raise without quad lag for increased quad control.  MET  3.  Patient will increase manual muscle test of left lower extremity to 4+ to 5/5 for increased stability with gait and activiites of daily living. NOT MET/ONGOING     LONG TERM GOALS  1.  Patient will go up/down stairs reciprocal pattern without handrails and good eccentric control on left lower extremity. PARTIALLY MET - able to go up and down 4 steps reciprocally using hand rails - still lacks good quad control descending on left lower extremity   2.  Patient will ambulate independent without crutches, without deviation and without pain. PARTIALLY MET - ambulating without assistive device but still has some mild antalgia due to lack of full knee extension on left lower extremity and still has some mild pain at times  3.  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

## 2023-12-07 ENCOUNTER — CLINICAL SUPPORT (OUTPATIENT)
Dept: REHABILITATION | Facility: HOSPITAL | Age: 58
End: 2023-12-07
Payer: COMMERCIAL

## 2023-12-07 DIAGNOSIS — R26.9 GAIT DISTURBANCE: ICD-10-CM

## 2023-12-07 DIAGNOSIS — Z87.81 S/P ORIF (OPEN REDUCTION INTERNAL FIXATION) FRACTURE: ICD-10-CM

## 2023-12-07 DIAGNOSIS — S82.142D CLOSED FRACTURE OF LEFT TIBIAL PLATEAU WITH ROUTINE HEALING, SUBSEQUENT ENCOUNTER: ICD-10-CM

## 2023-12-07 DIAGNOSIS — Z98.890 S/P ORIF (OPEN REDUCTION INTERNAL FIXATION) FRACTURE: ICD-10-CM

## 2023-12-07 DIAGNOSIS — M25.662 DECREASED ROM OF LEFT KNEE: Primary | ICD-10-CM

## 2023-12-07 PROCEDURE — 97112 NEUROMUSCULAR REEDUCATION: CPT

## 2023-12-07 PROCEDURE — 97530 THERAPEUTIC ACTIVITIES: CPT

## 2023-12-07 PROCEDURE — 97110 THERAPEUTIC EXERCISES: CPT

## 2023-12-07 NOTE — PROGRESS NOTES
[facility]   Physical Therapy Treatment Note      Name: Penny THOMAS Katherine  Clinic Number: [id number]    Therapy Diagnosis:   Encounter Diagnoses   Name Primary?    Decreased ROM of left knee Yes    Gait disturbance     Closed fracture of left tibial plateau with routine healing, subsequent encounter     S/P ORIF (open reduction internal fixation) fracture      Physician: Woo Liu MD    Visit Date: 12/7/2023    Physician Orders: PT Eval and Treat   Medical Diagnosis from Referral: see above  Evaluation Date: 10/4/2023  Authorization Period Expiration: 1/3/2024  Plan of Care Expiration: 12/15/2023     Date of Surgery: 8/24/2023  Visit # / Visits authorized: 16/19   FOTO: 1/3 = 40              2/3 = 55    3/3 = 49  Precautions: Standard      Time In: 4:45 pm  Time Out: 5:27 pm  Total Billable Time: 42 minutes    PTA Visit #: 0/5    Subjective     Patient reports: no new complaints.  She was compliant with home exercise program.  Response to previous treatment: no complaints    Functional change: returned to work    Pain: 0/10  Location: left knee      Objective       Active range of motion: -1 degrees extension upon arrival (12/4), ended with 130 degrees flexion     Treatment     Penny received the treatments listed below:      therapeutic exercises to develop strength, endurance, ROM, and flexibility for 14 minutes including:  Bike x 5 minutes   Slant board stretch x 2 minutes  Seated hamstring curls 5 plates x 30 - bilateral  Hamstring stretch 3 x 30 second hold - 5 plates   Prone quad stretch (w/ manual)    manual therapy techniques: Joint mobilizations, Myofacial release, Soft tissue Mobilization, and manual stretching were applied for 6 minutes, including:  Overpressure into extension  Patellar mobs  Prone quad stretch 3 x 30 seconds     neuromuscular re-education activities to improve: Balance, Coordination, Proprioception, and quad control for 9 minutes. The following  "activities were included:  Straight leg raise 3 x 10   Terminal knee extension 10 second hold x 10   Closed chain terminal knee extension 4 plates 10 second hold x 10     therapeutic activities to improve functional performance for 13 minutes, including:  Bilateral leg press 7 plates x 30 - w/ heel rock for terminal knee extension   Left leg press 4 plates x 30 - w/ heel rock for terminal knee extension   Lateral step downs 6" x 30   Single leg stance on blue foam pad x 2 minutes total    gait training to improve functional mobility and safety for 0 minutes, including:  Encouraging patient to ambulate with more normal and relaxed movements.    direct contact modalities after being cleared for contraindications:     supervised modalities after being cleared for contradictions: neuromuscular electrical stimulation with Israeli current x 0 minutes to improve quad awareness and strengthen in end range. She performed quad sets with moist heat in propped extension during this time. No adverse reaction noted.    Game Ready to left knee with medium pressure, coldest setting and lower extremity elevated for 0 minutes to decrease pain and swelling.    Patient Education and Home Exercises       Education provided:   - cues to not co-contract glutes and hamstrings with quad set     Written Home Exercises Provided: Patient instructed to cont prior HEP. Exercises were reviewed and Penny was able to demonstrate them prior to the end of the session.  Penny demonstrated good  understanding of the education provided. See Electronic Medical Record under Patient Instructions for exercises provided during therapy sessions    Assessment     Penny did well with exercises in clinic today. She still struggles some with terminal knee extension. Penny was able to do lateral step downs from 6" step without pain but it was challenging. Able to get 130 degrees knee flexion today at treatment end.      Penny is progressing well towards her goals. "   Patient prognosis is Good.     Patient will continue to benefit from skilled outpatient physical therapy to address the deficits listed in the problem list box on initial evaluation, provide pt/family education and to maximize pt's level of independence in the home and community environment.     Patient's spiritual, cultural and educational needs considered and pt agreeable to plan of care and goals.     Anticipated barriers to physical therapy: none    SHORT TERM GOALS  1.  Patient to be independent with home exercise program to facilitate carryover between therapy visits. MET  2.  Patient will have 0-130 degrees range of motion left knee. 1-128 degrees   2.  Patient will perform straight leg raise without quad lag for increased quad control.  MET  3.  Patient will increase manual muscle test of left lower extremity to 4+ to 5/5 for increased stability with gait and activiites of daily living. NOT MET/ONGOING     LONG TERM GOALS  1.  Patient will go up/down stairs reciprocal pattern without handrails and good eccentric control on left lower extremity. PARTIALLY MET - able to go up and down 4 steps reciprocally using hand rails - still lacks good quad control descending on left lower extremity   2.  Patient will ambulate independent without crutches, without deviation and without pain. PARTIALLY MET - ambulating without assistive device but still has some mild antalgia due to lack of full knee extension on left lower extremity and still has some mild pain at times  3.  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

## 2023-12-11 ENCOUNTER — CLINICAL SUPPORT (OUTPATIENT)
Dept: REHABILITATION | Facility: HOSPITAL | Age: 58
End: 2023-12-11
Payer: COMMERCIAL

## 2023-12-11 DIAGNOSIS — R26.9 GAIT DISTURBANCE: ICD-10-CM

## 2023-12-11 DIAGNOSIS — M25.662 DECREASED ROM OF LEFT KNEE: Primary | ICD-10-CM

## 2023-12-11 DIAGNOSIS — Z87.81 S/P ORIF (OPEN REDUCTION INTERNAL FIXATION) FRACTURE: ICD-10-CM

## 2023-12-11 DIAGNOSIS — Z98.890 S/P ORIF (OPEN REDUCTION INTERNAL FIXATION) FRACTURE: ICD-10-CM

## 2023-12-11 PROCEDURE — 97530 THERAPEUTIC ACTIVITIES: CPT | Mod: CQ

## 2023-12-11 PROCEDURE — 97112 NEUROMUSCULAR REEDUCATION: CPT | Mod: CQ

## 2023-12-11 PROCEDURE — 97110 THERAPEUTIC EXERCISES: CPT | Mod: CQ

## 2023-12-11 NOTE — PROGRESS NOTES
[facility]   Physical Therapy Treatment Note      Name: Penny THOMAS Katherine  Clinic Number: [id number]    Therapy Diagnosis:   Encounter Diagnoses   Name Primary?    Decreased ROM of left knee Yes    Gait disturbance     S/P ORIF (open reduction internal fixation) fracture      Physician: Woo Liu MD    Visit Date: 12/11/2023    Physician Orders: PT Eval and Treat   Medical Diagnosis from Referral: see above  Evaluation Date: 10/4/2023  Authorization Period Expiration: 1/3/2024  Plan of Care Expiration: 12/15/2023     Date of Surgery: 8/24/2023  Visit # / Visits authorized: 17/19   FOTO: 1/3 = 40              2/3 = 55    3/3 = 49  Precautions: Standard      Time In: 1:36 pm  Time Out: 2:16 pm  Total Billable Time: 40 minutes    PTA Visit #: 1/5    Subjective     Patient reports:overall she feels much better. She still has discomfort when she first gets out of the car.  She was compliant with home exercise program.  Response to previous treatment: no complaints    Functional change: returned to work    Pain: 0/10  Location: left knee      Objective       Active range of motion: -1 degrees extension upon arrival (12/4), 128 degrees flexion on arrival    Treatment     Penny received the treatments listed below:      therapeutic exercises to develop strength, endurance, ROM, and flexibility for 14 minutes including:  Bike x 5 minutes   Slant board stretch x 2 minutes  Seated hamstring curls 5 plates x 30 - bilateral (still very challenging)  Hamstring stretch 3 x 30 second hold - 5 plates   Prone quad stretch (w/ manual)    manual therapy techniques: Joint mobilizations, Myofacial release, Soft tissue Mobilization, and manual stretching were applied for 0 minutes, including:  Overpressure into extension  Patellar mobs  Prone quad stretch 3 x 30 seconds     neuromuscular re-education activities to improve: Balance, Coordination, Proprioception, and quad control for 10 minutes. The  "following activities were included:  Straight leg raise 3 x 10   Terminal knee extension 10 second hold x 10   Closed chain terminal knee extension 4 plates 10 second hold x 10     therapeutic activities to improve functional performance for 16 minutes, including:  Bilateral leg press 7 plates x 30 - w/ heel rock for terminal knee extension   Left leg press 4 plates x 30 - w/ heel rock for terminal knee extension   Lateral step downs 6" x 30   Single leg stance on blue foam pad x 3 minutes total    gait training to improve functional mobility and safety for 0 minutes, including:  Encouraging patient to ambulate with more normal and relaxed movements.    direct contact modalities after being cleared for contraindications:     supervised modalities after being cleared for contradictions: neuromuscular electrical stimulation with Brazilian current x 0 minutes to improve quad awareness and strengthen in end range. She performed quad sets with moist heat in propped extension during this time. No adverse reaction noted.    Game Ready to left knee with medium pressure, coldest setting and lower extremity elevated for 0 minutes to decrease pain and swelling.    Patient Education and Home Exercises       Education provided:   - cues to not co-contract glutes and hamstrings with quad set     Written Home Exercises Provided: Patient instructed to cont prior HEP. Exercises were reviewed and Penny was able to demonstrate them prior to the end of the session.  Penny demonstrated good  understanding of the education provided. See Electronic Medical Record under Patient Instructions for exercises provided during therapy sessions    Assessment     Penny did well with exercises in clinic. She still needs cues to maintain extension with straight leg raises. She also needs reminders to allow full extension in leg press. Her gait has improved with only occasional stiff steps. Will continue to progress as tolerated.      Penny is " progressing well towards her goals.   Patient prognosis is Good.     Patient will continue to benefit from skilled outpatient physical therapy to address the deficits listed in the problem list box on initial evaluation, provide pt/family education and to maximize pt's level of independence in the home and community environment.     Patient's spiritual, cultural and educational needs considered and pt agreeable to plan of care and goals.     Anticipated barriers to physical therapy: none    SHORT TERM GOALS  1.  Patient to be independent with home exercise program to facilitate carryover between therapy visits. MET  2.  Patient will have 0-130 degrees range of motion left knee. 1-128 degrees   2.  Patient will perform straight leg raise without quad lag for increased quad control.  MET  3.  Patient will increase manual muscle test of left lower extremity to 4+ to 5/5 for increased stability with gait and activiites of daily living. NOT MET/ONGOING     LONG TERM GOALS  1.  Patient will go up/down stairs reciprocal pattern without handrails and good eccentric control on left lower extremity. PARTIALLY MET - able to go up and down 4 steps reciprocally using hand rails - still lacks good quad control descending on left lower extremity   2.  Patient will ambulate independent without crutches, without deviation and without pain. PARTIALLY MET - ambulating without assistive device but still has some mild antalgia due to lack of full knee extension on left lower extremity and still has some mild pain at times  3.  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

## 2023-12-14 ENCOUNTER — CLINICAL SUPPORT (OUTPATIENT)
Dept: REHABILITATION | Facility: HOSPITAL | Age: 58
End: 2023-12-14
Payer: COMMERCIAL

## 2023-12-14 DIAGNOSIS — M25.662 DECREASED ROM OF LEFT KNEE: Primary | ICD-10-CM

## 2023-12-14 DIAGNOSIS — R26.9 GAIT DISTURBANCE: ICD-10-CM

## 2023-12-14 DIAGNOSIS — Z98.890 S/P ORIF (OPEN REDUCTION INTERNAL FIXATION) FRACTURE: ICD-10-CM

## 2023-12-14 DIAGNOSIS — Z87.81 S/P ORIF (OPEN REDUCTION INTERNAL FIXATION) FRACTURE: ICD-10-CM

## 2023-12-14 DIAGNOSIS — S82.142D CLOSED FRACTURE OF LEFT TIBIAL PLATEAU WITH ROUTINE HEALING, SUBSEQUENT ENCOUNTER: ICD-10-CM

## 2023-12-14 PROCEDURE — 97110 THERAPEUTIC EXERCISES: CPT

## 2023-12-14 PROCEDURE — 97530 THERAPEUTIC ACTIVITIES: CPT

## 2023-12-14 PROCEDURE — 97112 NEUROMUSCULAR REEDUCATION: CPT

## 2023-12-14 NOTE — PROGRESS NOTES
[facility]   Physical Therapy Treatment Note      Name: Penny THOMAS Katherine  Clinic Number: [id number]    Therapy Diagnosis:   Encounter Diagnoses   Name Primary?    Decreased ROM of left knee Yes    Gait disturbance     Closed fracture of left tibial plateau with routine healing, subsequent encounter     S/P ORIF (open reduction internal fixation) fracture      Physician: Woo Liu MD    Visit Date: 12/14/2023    Physician Orders: PT Eval and Treat   Medical Diagnosis from Referral: see above  Evaluation Date: 10/4/2023  Authorization Period Expiration: 1/3/2024  Plan of Care Expiration: 12/15/2023     Date of Surgery: 8/24/2023  Visit # / Visits authorized: 17/19   FOTO: 1/3 = 40              2/3 = 55    3/3 = 49  Precautions: Standard      Time In: 4:44 pm  Time Out: 5:28 pm  Total Billable Time: 43 minutes    PTA Visit #: 0/5    Subjective     Patient reports:overall she feels much better. She still has discomfort when she first gets out of the car.  She was compliant with home exercise program.  Response to previous treatment: no complaints    Functional change: returned to work    Pain: 0/10  Location: left knee      Objective       Active range of motion: -1 degrees extension upon arrival (12/4), 128 degrees flexion on arrival    Treatment     Penny received the treatments listed below:      therapeutic exercises to develop strength, endurance, ROM, and flexibility for 14 minutes including:  Bike x 5 minutes   Slant board stretch x 2 minutes  Seated hamstring curls 5 plates x 30 - bilateral (still very challenging)  Hamstring stretch 3 x 30 second hold - 5 plates   Prone quad stretch (w/ manual)    manual therapy techniques: Joint mobilizations, Myofacial release, Soft tissue Mobilization, and manual stretching were applied for 3 minutes, including:  Overpressure into extension  Patellar mobs  Prone quad stretch 3 x 30 seconds     neuromuscular re-education activities to  "improve: Balance, Coordination, Proprioception, and quad control for 10 minutes. The following activities were included:  Quad set in propped extension 10 x 10 second hold   Straight leg raise 3 x 10   Terminal knee extension 10 second hold x 10   Closed chain terminal knee extension 4 plates 10 second hold x 10     therapeutic activities to improve functional performance for 16 minutes, including:  Bilateral leg press 7 plates x 30 - w/ heel rock for terminal knee extension   Left leg press 4 plates x 30 - w/ heel rock for terminal knee extension   Lateral step downs 6" x 30   Single leg stance on rocker board x 3 minutes total    gait training to improve functional mobility and safety for 0 minutes, including:  Encouraging patient to ambulate with more normal and relaxed movements.    direct contact modalities after being cleared for contraindications:     supervised modalities after being cleared for contradictions: neuromuscular electrical stimulation with Swedish current x 0 minutes to improve quad awareness and strengthen in end range. She performed quad sets with moist heat in propped extension during this time. No adverse reaction noted.    Game Ready to left knee with medium pressure, coldest setting and lower extremity elevated for 0 minutes to decrease pain and swelling.    Patient Education and Home Exercises       Education provided:   - cues to not co-contract glutes and hamstrings with quad set     Written Home Exercises Provided: Patient instructed to cont prior HEP. Exercises were reviewed and Penny was able to demonstrate them prior to the end of the session.  Penny demonstrated good  understanding of the education provided. See Electronic Medical Record under Patient Instructions for exercises provided during therapy sessions    Assessment     Penny did much better with terminal knee extension today. She was able to do straight leg raises without quad lag. She does still need reminders to allow " full extension in leg press. Her gait has improved with only occasional stiff steps. Will continue to progress as tolerated.      Penny is progressing well towards her goals.   Patient prognosis is Good.     Patient will continue to benefit from skilled outpatient physical therapy to address the deficits listed in the problem list box on initial evaluation, provide pt/family education and to maximize pt's level of independence in the home and community environment.     Patient's spiritual, cultural and educational needs considered and pt agreeable to plan of care and goals.     Anticipated barriers to physical therapy: none    SHORT TERM GOALS  1.  Patient to be independent with home exercise program to facilitate carryover between therapy visits. MET  2.  Patient will have 0-130 degrees range of motion left knee. 1-128 degrees   2.  Patient will perform straight leg raise without quad lag for increased quad control.  MET  3.  Patient will increase manual muscle test of left lower extremity to 4+ to 5/5 for increased stability with gait and activiites of daily living. NOT MET/ONGOING     LONG TERM GOALS  1.  Patient will go up/down stairs reciprocal pattern without handrails and good eccentric control on left lower extremity. PARTIALLY MET - able to go up and down 4 steps reciprocally using hand rails - still lacks good quad control descending on left lower extremity   2.  Patient will ambulate independent without crutches, without deviation and without pain. PARTIALLY MET - ambulating without assistive device but still has some mild antalgia due to lack of full knee extension on left lower extremity and still has some mild pain at times  3.  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

## 2023-12-18 ENCOUNTER — CLINICAL SUPPORT (OUTPATIENT)
Dept: REHABILITATION | Facility: HOSPITAL | Age: 58
End: 2023-12-18
Payer: COMMERCIAL

## 2023-12-18 DIAGNOSIS — M25.662 DECREASED ROM OF LEFT KNEE: Primary | ICD-10-CM

## 2023-12-18 DIAGNOSIS — R26.9 GAIT DISTURBANCE: ICD-10-CM

## 2023-12-18 DIAGNOSIS — Z98.890 S/P ORIF (OPEN REDUCTION INTERNAL FIXATION) FRACTURE: ICD-10-CM

## 2023-12-18 DIAGNOSIS — Z87.81 S/P ORIF (OPEN REDUCTION INTERNAL FIXATION) FRACTURE: ICD-10-CM

## 2023-12-18 PROCEDURE — 97530 THERAPEUTIC ACTIVITIES: CPT | Mod: CQ

## 2023-12-18 PROCEDURE — 97112 NEUROMUSCULAR REEDUCATION: CPT | Mod: CQ

## 2023-12-18 PROCEDURE — 97110 THERAPEUTIC EXERCISES: CPT | Mod: CQ

## 2023-12-18 NOTE — PROGRESS NOTES
[facility]   Physical Therapy Treatment Note      Name: Penny Murphy  Clinic Number: [id number]    Therapy Diagnosis:   Encounter Diagnoses   Name Primary?    Decreased ROM of left knee Yes    Gait disturbance     S/P ORIF (open reduction internal fixation) fracture      Physician: Woo Liu MD    Visit Date: 12/18/2023    Physician Orders: PT Eval and Treat   Medical Diagnosis from Referral: see above  Evaluation Date: 10/4/2023  Authorization Period Expiration: 1/3/2024  Plan of Care Expiration: 12/15/2023     Date of Surgery: 8/24/2023  Visit # / Visits authorized: 19/19   FOTO: 1/3 = 40              2/3 = 55    3/3 = 49   4/4 = 62  Precautions: Standard      Time In: 1:46 pm  Time Out: 2:33 pm  Total Billable Time: 40 minutes    PTA Visit #: 1/5    Subjective     Patient reports: she is still stiff when she first gets out of the car or out of a chair.   She was compliant with home exercise program.  Response to previous treatment: no complaints    Functional change: returned to work    Pain: 0/10  Location: left knee      Objective       Active range of motion: 0-128 degrees range of motion     Treatment     Penny received the treatments listed below:      therapeutic exercises to develop strength, endurance, ROM, and flexibility for 14 minutes including:  Bike x 5 minutes   Slant board stretch x 2 minutes  Seated hamstring curls 4 plates x 30 - bilateral   Hamstring stretch 3 x 30 second hold at stairs   Prone quad stretch (w/ manual)    neuromuscular re-education activities to improve: Balance, Coordination, Proprioception, and quad control for 10 minutes. The following activities were included:  Straight leg raise 3 x 10   Terminal knee extension 10 second hold x 10   Closed chain terminal knee extension 4 plates 10 second hold x 10     therapeutic activities to improve functional performance for 16 minutes, including:  Bilateral leg press 7 plates x 30 - w/ heel rock  "for terminal knee extension   Left leg press 4 plates x 30 - w/ heel rock for terminal knee extension   Lateral step downs 6" x 30   Single leg stance on rocker board x 3 minutes total    gait training to improve functional mobility and safety for 0 minutes, including:  Encouraging patient to ambulate with more normal and relaxed movements.      Patient Education and Home Exercises       Education provided:   - cues to not co-contract glutes and hamstrings with quad set     Written Home Exercises Provided: Patient instructed to cont prior HEP. Exercises were reviewed and Penny was able to demonstrate them prior to the end of the session.  Penny demonstrated good  understanding of the education provided. See Electronic Medical Record under Patient Instructions for exercises provided during therapy sessions    Assessment     Penny arrived for her last approved visit with PT. She did well with all activities and was given a gray theraband to progress home exercise program. Her FOTO increased to 62. She was discharge to home exercise program per Plan of Care.    Penny is progressing well towards her goals.   Patient prognosis is Good.       Patient's spiritual, cultural and educational needs considered and pt agreeable to plan of care and goals.     Anticipated barriers to physical therapy: none    SHORT TERM GOALS  1.  Patient to be independent with home exercise program to facilitate carryover between therapy visits. MET  2.  Patient will have 0-130 degrees range of motion left knee. 1-128 degrees   2.  Patient will perform straight leg raise without quad lag for increased quad control.  MET  3.  Patient will increase manual muscle test of left lower extremity to 4+ to 5/5 for increased stability with gait and activiites of daily living. NOT MET/ONGOING     LONG TERM GOALS  1.  Patient will go up/down stairs reciprocal pattern without handrails and good eccentric control on left lower extremity. PARTIALLY MET - able " to go up and down 4 steps reciprocally using hand rails - still lacks good quad control descending on left lower extremity   2.  Patient will ambulate independent without crutches, without deviation and without pain. PARTIALLY MET - ambulating without assistive device but still has some mild antalgia due to lack of full knee extension on left lower extremity and still has some mild pain at times  3.  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    Tamiko Tate, PTA    12/18/2023

## (undated) DEVICE — TOURNIQUET SB QC SP 34X4IN

## (undated) DEVICE — GLOVE BIOGEL SKINSENSE PI 6.5

## (undated) DEVICE — GLOVE PROTEXIS PI SYN SURG 6.0

## (undated) DEVICE — DRAPE C-ARMOR EQUIPMENT COVER

## (undated) DEVICE — BIT DRILL 2.8 W/QC PERCU 200MM

## (undated) DEVICE — COMPR KNEE STRAIGHT STAY 20IN

## (undated) DEVICE — STOCKINETTE TUBULAR 2PL 6 X 4

## (undated) DEVICE — BAG RECTANGLE RBBRBND 30X36IN

## (undated) DEVICE — PAD CAST SPECIALIST STRL 3

## (undated) DEVICE — SUT VICRYL 1 CT-1 27 UNDIE

## (undated) DEVICE — TIP YANKAUERS BULB NO VENT

## (undated) DEVICE — Device

## (undated) DEVICE — APPLICATOR CHLORAPREP ORN 26ML

## (undated) DEVICE — BIT BRILL 2.5

## (undated) DEVICE — SUT 2-0 VICRYL / CT-1

## (undated) DEVICE — GOWN NONREINF SET-IN SLV 2XL

## (undated) DEVICE — SKIN STAPLER PMR35

## (undated) DEVICE — GLOVE 8.5 PROTEXIS PI BLUE

## (undated) DEVICE — BANDAGE ACE DOUBLE STER 6IN

## (undated) DEVICE — CAST LARGE OR FROM CAST CART

## (undated) DEVICE — GLOVE 6.5 PROTEXIS PI BLUE

## (undated) DEVICE — SOL NACL IRR 1000ML BTL

## (undated) DEVICE — SPONGE COTTON TRAY 4X4IN

## (undated) DEVICE — SCREW CORTEX 3.5MM 36MM
Type: IMPLANTABLE DEVICE | Site: LEG | Status: NON-FUNCTIONAL
Removed: 2023-08-24

## (undated) DEVICE — GLOVE 6.0 PROTEXIS PI BLUE

## (undated) DEVICE — BANDAGE ESMARK 6INX3YD

## (undated) DEVICE — GLOVE BIOGEL SKINSENSE PI 8.0